# Patient Record
Sex: FEMALE | Race: WHITE | NOT HISPANIC OR LATINO | URBAN - METROPOLITAN AREA
[De-identification: names, ages, dates, MRNs, and addresses within clinical notes are randomized per-mention and may not be internally consistent; named-entity substitution may affect disease eponyms.]

---

## 2020-07-25 ENCOUNTER — TELEPHONE ENCOUNTER (OUTPATIENT)
Dept: URBAN - METROPOLITAN AREA CLINIC 13 | Facility: CLINIC | Age: 65
End: 2020-07-25

## 2020-07-25 RX ORDER — LOSARTAN POTASSIUM 100 MG/1
TAKE 1 TABLET DAILY TABLET, FILM COATED ORAL
Refills: 0 | OUTPATIENT
End: 2017-05-25

## 2020-07-25 RX ORDER — ATORVASTATIN CALCIUM 10 MG/1
TAKE 1 TABLET AT BEDTIME TABLET, FILM COATED ORAL
Refills: 0 | OUTPATIENT
End: 2017-05-25

## 2020-07-25 RX ORDER — METHOCARBAMOL 750 MG/1
TAKE 1 TABLET AT BEDTIME AS NEEDED TABLET, FILM COATED ORAL
Refills: 0 | OUTPATIENT
End: 2015-05-13

## 2020-07-25 RX ORDER — CARVEDILOL 3.12 MG/1
TAKE 1 TABLET BY MOUTH TWICE A DAY WITH MEALS TABLET, FILM COATED ORAL
Qty: 60 | Refills: 1 | OUTPATIENT
End: 2015-01-30

## 2020-07-25 RX ORDER — CARVEDILOL 3.12 MG/1
TAKE 1 TABLET TWICE DAILY WITH MEALS TABLET, FILM COATED ORAL
Qty: 60 | Refills: 0 | OUTPATIENT
Start: 2014-09-17 | End: 2016-03-04

## 2020-07-25 RX ORDER — INSULIN DETEMIR 100 [IU]/ML
INJECT SUBCUTANEOUSLY WITH EVENING MEAL OR AT BEDTIME AS DIRECTED INJECTION, SOLUTION SUBCUTANEOUS
Refills: 0 | OUTPATIENT
Start: 2009-12-23 | End: 2010-07-02

## 2020-07-25 RX ORDER — OXYCODONE HYDROCHLORIDE 5 MG/1
TAKE 1 TABLET EVERY 6 HOURS AS NEEDED FOR BREAKTHROUGH PAIN TABLET ORAL
Refills: 0 | OUTPATIENT
End: 2015-08-14

## 2020-07-25 RX ORDER — CALCIUM CARBONATE/VITAMIN D3 500-10/5ML
TAKE 2 CAPSULE TWICE DAILY LIQUID (ML) ORAL
Refills: 0 | OUTPATIENT
End: 2017-05-25

## 2020-07-25 RX ORDER — VALGANCICLOVIR 450 MG/1
TAKE 1 TABLET DAILY TABLET, FILM COATED ORAL
Qty: 30 | Refills: 0 | OUTPATIENT
Start: 2014-11-28 | End: 2015-05-13

## 2020-07-25 RX ORDER — TACROLIMUS 5 MG/1
TAKE 1 CAPSULE TWICE DAILY CAPSULE, GELATIN COATED ORAL
Refills: 0 | OUTPATIENT
End: 2016-06-15

## 2020-07-25 RX ORDER — SIROLIMUS 2 MG/1
TAKE 1 TABLET DAILY TABLET, SUGAR COATED ORAL
Refills: 0 | OUTPATIENT
End: 2017-05-25

## 2020-07-25 RX ORDER — AMLODIPINE BESYLATE 10 MG/1
TABLET ORAL
Qty: 90 | Refills: 0 | OUTPATIENT
Start: 2012-03-19 | End: 2012-10-31

## 2020-07-25 RX ORDER — FUROSEMIDE 40 MG/1
TAKE 1 TABLET DAILY TABLET ORAL
Qty: 30 | Refills: 5 | OUTPATIENT
Start: 2014-03-27 | End: 2015-01-30

## 2020-07-25 RX ORDER — MYCOPHENOLATE MOFETIL 500 MG/1
TAKE 1 TABLET TWICE DAILY TABLET, FILM COATED ORAL
Refills: 0 | OUTPATIENT
End: 2017-05-25

## 2020-07-25 RX ORDER — IRBESARTAN 300 MG
TAKE 1 TABLET DAILY TABLET ORAL
Refills: 0 | OUTPATIENT
End: 2013-01-11

## 2020-07-25 RX ORDER — MYCOPHENOLATE MOFETIL 250 MG/1
TAKE 1 CAPSULE TWICE DAILY CAPSULE ORAL
Refills: 0 | OUTPATIENT
End: 2017-11-06

## 2020-07-25 RX ORDER — INSULIN LISPRO 100 [IU]/ML
USE AS DIRECTED INJECTION, SOLUTION INTRAVENOUS; SUBCUTANEOUS
Refills: 0 | OUTPATIENT
Start: 2009-12-23 | End: 2010-07-02

## 2020-07-25 RX ORDER — SPIRONOLACTONE 100 MG/1
TAKE 1 TABLET DAILY TABLET, FILM COATED ORAL
Qty: 30 | Refills: 5 | OUTPATIENT
Start: 2014-03-27 | End: 2015-01-30

## 2020-07-25 RX ORDER — BISACODYL 10 MG/1
INSERT 1 SUPP DAILY PRN SUPPOSITORY RECTAL
Refills: 0 | OUTPATIENT
End: 2015-05-13

## 2020-07-25 RX ORDER — RIFAXIMIN 550 MG/1
TAKE 1 TABLET TWICE A DAY TABLET ORAL
Qty: 60 | Refills: 3 | OUTPATIENT
End: 2015-01-30

## 2020-07-25 RX ORDER — PANTOPRAZOLE SODIUM 40 MG/1
TAKE 1 TABLET TWICE DAILY TABLET, DELAYED RELEASE ORAL
Qty: 60 | Refills: 3 | OUTPATIENT

## 2020-07-25 RX ORDER — LACTULOSE 10 G/15ML
TAKE 1 TABLESPOONFUL DAILY SOLUTION ORAL
Qty: 450 | Refills: 3 | OUTPATIENT
End: 2015-01-30

## 2020-07-25 RX ORDER — PRAVASTATIN SODIUM 40 MG/1
TAKE 1 TABLET DAILY AT BEDTIME TABLET ORAL
Qty: 30 | Refills: 0 | OUTPATIENT
Start: 2012-08-24 | End: 2013-01-11

## 2020-07-25 RX ORDER — PANTOPRAZOLE SODIUM 40 MG/1
TAKE 1 TABLET DAILY TABLET, DELAYED RELEASE ORAL
Qty: 30 | Refills: 0 | OUTPATIENT
Start: 2014-06-11 | End: 2015-11-16

## 2020-07-25 RX ORDER — DRONABINOL 5 MG/1
TAKE 1 CAPSULE DAILY CAPSULE ORAL
Refills: 0 | OUTPATIENT
End: 2016-06-15

## 2020-07-25 RX ORDER — LISINOPRIL 5 MG/1
TAKE 1 TABLET DAILY TABLET ORAL
Refills: 0 | OUTPATIENT
End: 2014-06-16

## 2020-07-25 RX ORDER — PHENYLEPHRINE HCL 10 MG
TAKE AS DIRECTED TABLET ORAL
Refills: 0 | OUTPATIENT
Start: 2007-10-19 | End: 2009-09-25

## 2020-07-25 RX ORDER — CA/D3/MAG OX/ZINC/COP/MANG/BOR 600 MG-800
TAKE 1 TABLET DAILY TABLET,CHEWABLE ORAL
Refills: 0 | OUTPATIENT
End: 2017-11-06

## 2020-07-25 RX ORDER — MYCOPHENOLATE MOFETIL 250 MG/1
TAKE 2 CAPSULE TWICE DAILY CAPSULE ORAL
Refills: 0 | OUTPATIENT
End: 2015-05-13

## 2020-07-25 RX ORDER — IBUPROFEN 800 MG/1
TAKE  TABLET AS NEEDED TABLET ORAL
Refills: 0 | OUTPATIENT
End: 2012-10-31

## 2020-07-25 RX ORDER — MELATONIN 5 MG
TAKE AS DIRECTED. DOSAGE UNKNOWN CAPSULE ORAL
Refills: 0 | OUTPATIENT
End: 2013-01-11

## 2020-07-25 RX ORDER — MULTIVITAMIN
TAKE 1 TABLET DAILY TABLET ORAL
Refills: 0 | OUTPATIENT
End: 2014-06-16

## 2020-07-25 RX ORDER — DIPHENHYDRAMINE HCL 50 MG/1
TAKE 1 CAPSULE BEDTIME 25 MG CAPSULE ORAL
Refills: 0 | OUTPATIENT
Start: 2010-07-02 | End: 2012-08-24

## 2020-07-25 RX ORDER — URSODIOL 500 MG/1
TAKE 1 TABLET TWICE DAILY TABLET ORAL
Refills: 0 | OUTPATIENT
Start: 2014-11-28 | End: 2015-05-13

## 2020-07-25 RX ORDER — TACROLIMUS 1 MG/1
TAKE 8 CAPSULE TWICE DAILY CAPSULE, GELATIN COATED ORAL
Refills: 0 | OUTPATIENT
End: 2015-05-13

## 2020-07-26 ENCOUNTER — TELEPHONE ENCOUNTER (OUTPATIENT)
Dept: URBAN - METROPOLITAN AREA CLINIC 13 | Facility: CLINIC | Age: 65
End: 2020-07-26

## 2020-07-26 RX ORDER — AMLODIPINE BESYLATE 5 MG/1
TABLET ORAL
Qty: 30 | Refills: 0 | Status: ACTIVE | COMMUNITY
Start: 2011-09-27

## 2020-07-26 RX ORDER — PROMETHAZINE HYDROCHLORIDE 25 MG/1
TABLET ORAL
Qty: 20 | Refills: 0 | Status: ACTIVE | COMMUNITY
Start: 2015-03-31

## 2020-07-26 RX ORDER — OXYCODONE HYDROCHLORIDE 5 MG/1
TABLET ORAL
Qty: 30 | Refills: 0 | Status: ACTIVE | COMMUNITY
Start: 2015-01-22

## 2020-07-26 RX ORDER — MYCOPHENOLATE MOFETIL 250 MG/1
CAPSULE ORAL
Qty: 120 | Refills: 0 | Status: ACTIVE | COMMUNITY
Start: 2014-11-28

## 2020-07-26 RX ORDER — SULFAMETHOXAZOLE AND TRIMETHOPRIM 400; 80 MG/1; MG/1
TABLET ORAL
Qty: 30 | Refills: 0 | Status: ACTIVE | COMMUNITY
Start: 2014-11-28

## 2020-07-26 RX ORDER — URSODIOL 500 MG/1
TABLET ORAL
Qty: 60 | Refills: 0 | Status: ACTIVE | COMMUNITY
Start: 2014-10-15

## 2020-07-26 RX ORDER — ONDANSETRON HYDROCHLORIDE 4 MG/1
TABLET, FILM COATED ORAL
Qty: 8 | Refills: 0 | Status: ACTIVE | COMMUNITY
Start: 2015-03-06

## 2020-07-26 RX ORDER — RIBOFLAVIN (VITAMIN B2) 100 MG
TAKE 1 TABLET DAILY AS DIRECTED TABLET ORAL
Refills: 0 | Status: ACTIVE | COMMUNITY

## 2020-07-26 RX ORDER — EZETIMIBE 10 MG/1
TABLET ORAL
Qty: 30 | Refills: 0 | Status: ACTIVE | COMMUNITY
Start: 2012-01-30

## 2020-07-26 RX ORDER — CHOLESTYRAMINE 4 G/9G
POWDER, FOR SUSPENSION ORAL
Qty: 255 | Refills: 0 | Status: ACTIVE | COMMUNITY
Start: 2014-10-07

## 2020-07-26 RX ORDER — DOXAZOSIN 8 MG/1
TAKE 1 TABLET BEDTIME TABLET ORAL
Refills: 0 | Status: ACTIVE | COMMUNITY

## 2020-07-26 RX ORDER — CHOLESTYRAMINE 4 G/5.5G
POWDER, FOR SUSPENSION ORAL
Qty: 210 | Refills: 0 | Status: ACTIVE | COMMUNITY
Start: 2012-04-02

## 2020-07-26 RX ORDER — TRIAMCINOLONE ACETONIDE 1 MG/G
CREAM TOPICAL
Qty: 255 | Refills: 0 | Status: ACTIVE | COMMUNITY
Start: 2014-10-03

## 2020-07-26 RX ORDER — PREDNISONE 2.5 MG/1
TABLET ORAL
Qty: 16 | Refills: 0 | Status: ACTIVE | COMMUNITY
Start: 2014-11-28

## 2020-07-26 RX ORDER — LACTULOSE SOLUTION USP, 10 G/15 ML 10 G/15ML
SOLUTION ORAL; RECTAL
Qty: 255 | Refills: 0 | Status: ACTIVE | COMMUNITY
Start: 2014-05-13

## 2020-07-26 RX ORDER — FLUVASTATIN SODIUM 80 MG/1
TABLET, EXTENDED RELEASE ORAL
Qty: 30 | Refills: 0 | Status: ACTIVE | COMMUNITY
Start: 2012-01-24

## 2020-07-26 RX ORDER — INSULIN LISPRO 100 [IU]/ML
INJECT UNIT 3 TIMES DAILY SLIDING SCALE. PATIENT UNSURE OF DOSE INJECTION, SOLUTION INTRAVENOUS; SUBCUTANEOUS
Refills: 0 | Status: ACTIVE | COMMUNITY
Start: 2010-07-02

## 2020-07-26 RX ORDER — SIROLIMUS 2 MG/1
TAKE 0.5 TABLET TABLET, SUGAR COATED ORAL
Refills: 0 | Status: ACTIVE | COMMUNITY

## 2020-07-26 RX ORDER — SPIRONOLACTONE 25 MG/1
TAKE 2 TABLET DAILY TABLET, FILM COATED ORAL
Refills: 0 | Status: ACTIVE | COMMUNITY

## 2020-07-26 RX ORDER — TRAMADOL HYDROCHLORIDE 50 MG/1
TABLET ORAL
Qty: 30 | Refills: 0 | Status: ACTIVE | COMMUNITY
Start: 2014-11-01

## 2020-07-26 RX ORDER — INSULIN DETEMIR 100 [IU]/ML
INJECT 45 UNIT EVERY MORNING. PATIENT UNSURE OF DOSE INJECTION, SOLUTION SUBCUTANEOUS
Refills: 0 | Status: ACTIVE | COMMUNITY
Start: 2010-07-02

## 2020-07-26 RX ORDER — DRONABINOL 5 MG/1
CAPSULE ORAL
Qty: 30 | Refills: 0 | Status: ACTIVE | COMMUNITY
Start: 2015-01-24

## 2020-07-26 RX ORDER — BLOOD-GLUCOSE METER
EACH MISCELLANEOUS
Qty: 1 | Refills: 0 | Status: ACTIVE | COMMUNITY
Start: 2013-12-04

## 2020-07-26 RX ORDER — ASPIRIN 81 MG/1
TAKE 1 TABLET DAILY TABLET, CHEWABLE ORAL
Refills: 0 | Status: ACTIVE | COMMUNITY
Start: 2014-11-28

## 2020-07-26 RX ORDER — LACTULOSE SOLUTION USP, 10 G/15 ML 10 G/15ML
SOLUTION ORAL; RECTAL
Qty: 255 | Refills: 0 | Status: ACTIVE | COMMUNITY
Start: 2014-06-11

## 2020-07-26 RX ORDER — POLYETHYLENE GLYCOL 3350, SODIUM SULFATE, SODIUM CHLORIDE, POTASSIUM CHLORIDE, ASCORBIC ACID, SODIUM ASCORBATE 7.5-2.691G
KIT ORAL
Qty: 1 | Refills: 0 | Status: ACTIVE | COMMUNITY
Start: 2012-10-31

## 2020-07-26 RX ORDER — SODIUM POLYSTYRENE SULFONATE 4.1 MEQ/G
POWDER, FOR SUSPENSION ORAL; RECTAL
Qty: 255 | Refills: 0 | Status: ACTIVE | COMMUNITY
Start: 2015-02-26

## 2020-07-26 RX ORDER — SOFOSBUVIR 400 MG/1
TABLET, FILM COATED ORAL
Qty: 28 | Refills: 0 | Status: ACTIVE | COMMUNITY
Start: 2014-09-22

## 2020-07-26 RX ORDER — NITROFURANTOIN MONOHYDRATE/MACROCRYSTALLINE 25; 75 MG/1; MG/1
CAPSULE ORAL
Qty: 14 | Refills: 0 | Status: ACTIVE | COMMUNITY
Start: 2014-05-22

## 2020-07-26 RX ORDER — METHOCARBAMOL 500 MG/1
TABLET, FILM COATED ORAL
Qty: 30 | Refills: 0 | Status: ACTIVE | COMMUNITY
Start: 2014-07-22

## 2020-07-26 RX ORDER — CARVEDILOL 12.5 MG/1
TAKE 1 TABLET TWICE DAILY WITH MEALS TABLET, FILM COATED ORAL
Refills: 0 | Status: ACTIVE | COMMUNITY

## 2020-07-26 RX ORDER — PANTOPRAZOLE SODIUM 40 MG/1
TABLET, DELAYED RELEASE ORAL
Qty: 60 | Refills: 0 | Status: ACTIVE | COMMUNITY
Start: 2014-05-13

## 2020-07-26 RX ORDER — SODIUM BICARBONATE 650 MG/1
TAKE 1 TABLET 3 TIMES DAILY TABLET ORAL
Refills: 0 | Status: ACTIVE | COMMUNITY

## 2020-07-26 RX ORDER — ONDANSETRON HYDROCHLORIDE 4 MG/1
TABLET, FILM COATED ORAL
Qty: 6 | Refills: 0 | Status: ACTIVE | COMMUNITY
Start: 2014-05-13

## 2020-07-26 RX ORDER — AZITHROMYCIN DIHYDRATE 250 MG/1
TABLET, FILM COATED ORAL
Qty: 6 | Refills: 0 | Status: ACTIVE | COMMUNITY
Start: 2011-09-27

## 2020-07-26 RX ORDER — ONDANSETRON HYDROCHLORIDE 4 MG/1
TABLET, FILM COATED ORAL
Qty: 6 | Refills: 0 | Status: ACTIVE | COMMUNITY
Start: 2014-05-29

## 2022-08-04 ENCOUNTER — WEB ENCOUNTER (OUTPATIENT)
Dept: URBAN - METROPOLITAN AREA CLINIC 113 | Facility: CLINIC | Age: 67
End: 2022-08-04

## 2022-08-09 ENCOUNTER — OFFICE VISIT (OUTPATIENT)
Dept: URBAN - METROPOLITAN AREA CLINIC 113 | Facility: CLINIC | Age: 67
End: 2022-08-09
Payer: MEDICARE

## 2022-08-09 VITALS
SYSTOLIC BLOOD PRESSURE: 173 MMHG | HEIGHT: 66 IN | DIASTOLIC BLOOD PRESSURE: 64 MMHG | BODY MASS INDEX: 22.5 KG/M2 | RESPIRATION RATE: 20 BRPM | TEMPERATURE: 98 F | HEART RATE: 61 BPM | WEIGHT: 140 LBS

## 2022-08-09 DIAGNOSIS — R19.7 DIARRHEA, UNSPECIFIED TYPE: ICD-10-CM

## 2022-08-09 DIAGNOSIS — Z86.010 HISTORY OF ADENOMATOUS POLYP OF COLON: ICD-10-CM

## 2022-08-09 PROCEDURE — 99205 OFFICE O/P NEW HI 60 MIN: CPT | Performed by: INTERNAL MEDICINE

## 2022-08-09 RX ORDER — AMLODIPINE BESYLATE 5 MG/1
TABLET ORAL
Qty: 30 | Refills: 0 | Status: ON HOLD | COMMUNITY
Start: 2011-09-27

## 2022-08-09 RX ORDER — LACTULOSE SOLUTION USP, 10 G/15 ML 10 G/15ML
SOLUTION ORAL; RECTAL
Qty: 255 | Refills: 0 | Status: ON HOLD | COMMUNITY
Start: 2014-05-13

## 2022-08-09 RX ORDER — PROMETHAZINE HYDROCHLORIDE 25 MG/1
TABLET ORAL
Qty: 20 | Refills: 0 | Status: ON HOLD | COMMUNITY
Start: 2015-03-31

## 2022-08-09 RX ORDER — PREDNISONE 2.5 MG/1
TABLET ORAL
Qty: 16 | Refills: 0 | Status: ON HOLD | COMMUNITY
Start: 2014-11-28

## 2022-08-09 RX ORDER — NITROFURANTOIN MONOHYDRATE/MACROCRYSTALLINE 25; 75 MG/1; MG/1
CAPSULE ORAL
Qty: 14 | Refills: 0 | Status: ON HOLD | COMMUNITY
Start: 2014-05-22

## 2022-08-09 RX ORDER — SPIRONOLACTONE 25 MG/1
TAKE 2 TABLET DAILY TABLET, FILM COATED ORAL
Refills: 0 | Status: ON HOLD | COMMUNITY

## 2022-08-09 RX ORDER — OXYCODONE HYDROCHLORIDE 5 MG/1
TABLET ORAL
Qty: 30 | Refills: 0 | Status: ON HOLD | COMMUNITY
Start: 2015-01-22

## 2022-08-09 RX ORDER — AZITHROMYCIN DIHYDRATE 250 MG/1
TABLET, FILM COATED ORAL
Qty: 6 | Refills: 0 | Status: ON HOLD | COMMUNITY
Start: 2011-09-27

## 2022-08-09 RX ORDER — INSULIN LISPRO 100 [IU]/ML
INJECT UNIT 3 TIMES DAILY SLIDING SCALE. PATIENT UNSURE OF DOSE INJECTION, SOLUTION INTRAVENOUS; SUBCUTANEOUS
Refills: 0 | Status: ON HOLD | COMMUNITY
Start: 2010-07-02

## 2022-08-09 RX ORDER — ALLOPURINOL 100 MG/1
1 TABLET TABLET ORAL ONCE A DAY
Status: ACTIVE | COMMUNITY

## 2022-08-09 RX ORDER — SODIUM POLYSTYRENE SULFONATE 4.1 MEQ/G
POWDER, FOR SUSPENSION ORAL; RECTAL
Qty: 255 | Refills: 0 | Status: ON HOLD | COMMUNITY
Start: 2015-02-26

## 2022-08-09 RX ORDER — MYCOPHENOLATE MOFETIL 250 MG/1
CAPSULE ORAL
Qty: 120 | Refills: 0 | Status: ON HOLD | COMMUNITY
Start: 2014-11-28

## 2022-08-09 RX ORDER — DILTIAZEM HYDROCHLORIDE 300 MG/1
1 CAPSULE CAPSULE, COATED, EXTENDED RELEASE ORAL ONCE A DAY
Status: ACTIVE | COMMUNITY

## 2022-08-09 RX ORDER — CHOLESTYRAMINE 4 G/9G
POWDER, FOR SUSPENSION ORAL
Qty: 255 | Refills: 0 | Status: ON HOLD | COMMUNITY
Start: 2014-10-07

## 2022-08-09 RX ORDER — TACROLIMUS 1 MG/1
AS DIRECTED CAPSULE, GELATIN COATED ORAL TWICE A DAY
Status: ACTIVE | COMMUNITY

## 2022-08-09 RX ORDER — TRIAMCINOLONE ACETONIDE 1 MG/G
CREAM TOPICAL
Qty: 255 | Refills: 0 | Status: ON HOLD | COMMUNITY
Start: 2014-10-03

## 2022-08-09 RX ORDER — BUMETANIDE 2 MG/1
TAKE 1 TABLET TABLET ORAL TWICE A DAY
Status: ACTIVE | COMMUNITY

## 2022-08-09 RX ORDER — LIDOCAINE HCL 4 G/100G
1 APPLICATION CREAM TOPICAL THREE TIMES A DAY
Status: ACTIVE | COMMUNITY

## 2022-08-09 RX ORDER — CHOLESTYRAMINE 4 G/5.5G
POWDER, FOR SUSPENSION ORAL
Qty: 210 | Refills: 0 | Status: ON HOLD | COMMUNITY
Start: 2012-04-02

## 2022-08-09 RX ORDER — SODIUM BICARBONATE 650 MG/1
TAKE 1 TABLET 3 TIMES DAILY TABLET ORAL
Refills: 0 | Status: ON HOLD | COMMUNITY

## 2022-08-09 RX ORDER — PANTOPRAZOLE SODIUM 40 MG/1
TABLET, DELAYED RELEASE ORAL
Qty: 60 | Refills: 0 | Status: ON HOLD | COMMUNITY
Start: 2014-05-13

## 2022-08-09 RX ORDER — METHOCARBAMOL 500 MG/1
TABLET, FILM COATED ORAL
Qty: 30 | Refills: 0 | Status: ON HOLD | COMMUNITY
Start: 2014-07-22

## 2022-08-09 RX ORDER — EZETIMIBE 10 MG/1
TABLET ORAL
Qty: 30 | Refills: 0 | Status: ON HOLD | COMMUNITY
Start: 2012-01-30

## 2022-08-09 RX ORDER — TRAMADOL HYDROCHLORIDE 50 MG/1
TABLET ORAL
Qty: 30 | Refills: 0 | Status: ON HOLD | COMMUNITY
Start: 2014-11-01

## 2022-08-09 RX ORDER — SULFAMETHOXAZOLE AND TRIMETHOPRIM 400; 80 MG/1; MG/1
TABLET ORAL
Qty: 30 | Refills: 0 | Status: ON HOLD | COMMUNITY
Start: 2014-11-28

## 2022-08-09 RX ORDER — CARVEDILOL 12.5 MG/1
1 TABLET WITH FOOD TABLET, FILM COATED ORAL TWICE A DAY
Refills: 0 | Status: ACTIVE | COMMUNITY

## 2022-08-09 RX ORDER — SEVELAMER CARBONATE 2400 MG/1
1 PACKET MIXED WITH 60 ML OF WATER WITH MEALS POWDER, FOR SUSPENSION ORAL THREE TIMES A DAY
Status: ACTIVE | COMMUNITY

## 2022-08-09 RX ORDER — ONDANSETRON HYDROCHLORIDE 4 MG/1
TABLET, FILM COATED ORAL
Qty: 6 | Refills: 0 | Status: ON HOLD | COMMUNITY
Start: 2014-05-13

## 2022-08-09 RX ORDER — POLYETHYLENE GLYCOL 3350, SODIUM SULFATE, SODIUM CHLORIDE, POTASSIUM CHLORIDE, ASCORBIC ACID, SODIUM ASCORBATE 7.5-2.691G
KIT ORAL
Qty: 1 | Refills: 0 | Status: ON HOLD | COMMUNITY
Start: 2012-10-31

## 2022-08-09 RX ORDER — DRONABINOL 5 MG/1
CAPSULE ORAL
Qty: 30 | Refills: 0 | Status: ON HOLD | COMMUNITY
Start: 2015-01-24

## 2022-08-09 RX ORDER — SOFOSBUVIR 400 MG/1
TABLET, FILM COATED ORAL
Qty: 28 | Refills: 0 | Status: ON HOLD | COMMUNITY
Start: 2014-09-22

## 2022-08-09 RX ORDER — DOXAZOSIN 8 MG/1
TAKE 1 TABLET BEDTIME TABLET ORAL
Refills: 0 | Status: ON HOLD | COMMUNITY

## 2022-08-09 RX ORDER — BLOOD-GLUCOSE METER
EACH MISCELLANEOUS
Qty: 1 | Refills: 0 | Status: ON HOLD | COMMUNITY
Start: 2013-12-04

## 2022-08-09 RX ORDER — FLUVASTATIN SODIUM 80 MG/1
TABLET, EXTENDED RELEASE ORAL
Qty: 30 | Refills: 0 | Status: ON HOLD | COMMUNITY
Start: 2012-01-24

## 2022-08-09 RX ORDER — HYDRALAZINE HYDROCHLORIDE 10 MG/1
3 TABLETS TABLET, FILM COATED ORAL ONCE A DAY
Status: ACTIVE | COMMUNITY

## 2022-08-09 RX ORDER — SIROLIMUS 2 MG/1
TAKE 0.5 TABLET TABLET, SUGAR COATED ORAL
Refills: 0 | Status: ON HOLD | COMMUNITY

## 2022-08-09 RX ORDER — INSULIN DETEMIR 100 [IU]/ML
AS DIRECTED INJECTION, SOLUTION SUBCUTANEOUS
Status: ACTIVE | COMMUNITY

## 2022-08-09 RX ORDER — INSULIN DETEMIR 100 [IU]/ML
INJECT 45 UNIT EVERY MORNING. PATIENT UNSURE OF DOSE INJECTION, SOLUTION SUBCUTANEOUS
Refills: 0 | Status: ON HOLD | COMMUNITY
Start: 2010-07-02

## 2022-08-09 RX ORDER — ASPIRIN 81 MG/1
TAKE 1 TABLET DAILY TABLET, CHEWABLE ORAL
Refills: 0 | Status: ON HOLD | COMMUNITY
Start: 2014-11-28

## 2022-08-09 RX ORDER — RIBOFLAVIN (VITAMIN B2) 100 MG
TAKE 1 TABLET DAILY AS DIRECTED TABLET ORAL
Refills: 0 | Status: ON HOLD | COMMUNITY

## 2022-08-09 RX ORDER — URSODIOL 500 MG/1
TABLET ORAL
Qty: 60 | Refills: 0 | Status: ON HOLD | COMMUNITY
Start: 2014-10-15

## 2022-08-09 NOTE — HPI-TODAY'S VISIT:
Ms. 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  She was last seen here on May 25, 2017 for routine follow-up.  She subsequently underwent surveillance colonoscopy on November 6, 2017 which showed sigmoid colon diverticula, anastomosis in the sigmoid which was intact, but otherwise negative examination.  Follow-up colonoscopy is due this year.  She was seen in our office 6/15/16 to follow up  after treatment with Harvoni for hepatitis C.  She had been to [facility] and reported a recent hepatitis C RNA was negative.  Posttreatment hepatitis C viral RNA was negative.  She had also been diagnosed with syphilis after labs are performed to evaluate a rash on the palms of her hands.  This is also successfully treated.  Heartburn was controlled on omeprazole which she took during her course of   Loose stools had resolved on fiber.    5/23/16 RPR reactive.  CBC: CBC 4.8, hemoglobin 11.5, MCV 83.1, platelet 173.  BMP normal with the exception of glucose 135, BUN 42, creatinine 1.49, sodium 135, potassium 5.5, CO2 20.  LFTs: TB 0.3, , ALT 14, AST 13.  GGT 22.  Prograf 3.9.  Hepatitis C RNA undetectable.   At the time of her last OV on 5/25/17, she was doing well.  She continued to take over-the-counter Prilosec and denied heartburn.  Loose stools were generally controlled with daily fiber.  She denied any abdominal symptoms. She underwent surveillance colonoscopy in November 2017 (11/6/17).  This revealed A sigmoid colon anastomosis, left colon diverticulosis, but was otherwise negative.  Random colon biopsies were not done.  The patient states that she is doing "good" overall.  She is now on hemodialysis, which she gives herself at home, after her chronic renal insufficiency progressed into renal failure.  She has intermittent diarrhea with fecal urgency, although she denies seeing any blood in her stool.  She can also be constipated at times.  She is on the transplant list for a renal transplant in [place], although she is in the process of being evaluated at [facility] for the same process.  Both transplant centers wanted her to have a repeat colonoscopy performed prior to transplantation ideally.  The patient's most recent labs from May 2022 included an AST of 17, ALT of 13, alkaline phosphatase of 201, albumin of 4.6, hemoglobin of 11.5, hematocrit 35.5%.  She had a BUN of 35 and a creatinine of 5.16.  She gives me labs from late June of this year which showed a hemoglobin of 8.2 and a ferritin of 921.

## 2022-08-09 NOTE — EXAM-FUNCTIONAL ASSESSMENT
General--no acute distress Eyes--anicteric HENT--normocephalic, atraumatic head Neck--no lymphadenopathy Chest--normal breath sounds Heart--regular rate and rhythm; 3/6 systolic murmur Abdomen--soft, non tender, [place]ron scar upper abdomen, hernia abdominal wall, non distended, bowel sounds present Musculoskeletal--normal gait and station; AV fistula left arm Skin--no rashes Neurologic--Alert and oriented x 3 Psychiatric--stable mood, appropriate affect

## 2022-08-10 ENCOUNTER — LAB OUTSIDE AN ENCOUNTER (OUTPATIENT)
Dept: URBAN - METROPOLITAN AREA CLINIC 113 | Facility: CLINIC | Age: 67
End: 2022-08-10

## 2022-09-01 ENCOUNTER — TELEPHONE ENCOUNTER (OUTPATIENT)
Dept: URBAN - METROPOLITAN AREA CLINIC 113 | Facility: CLINIC | Age: 67
End: 2022-09-01

## 2022-09-04 ENCOUNTER — CLAIMS CREATED FROM THE CLAIM WINDOW (OUTPATIENT)
Dept: URBAN - METROPOLITAN AREA MEDICAL CENTER 19 | Facility: MEDICAL CENTER | Age: 67
End: 2022-09-04
Payer: MEDICARE

## 2022-09-04 DIAGNOSIS — D64.89 ANEMIA DUE TO OTHER CAUSE: ICD-10-CM

## 2022-09-04 DIAGNOSIS — R74.8 ABNORMAL ALKALINE PHOSPHATASE TEST: ICD-10-CM

## 2022-09-04 DIAGNOSIS — R74.01 ABNORMAL/ELEVATED TRANSAMINASE (SGOT, AMINOTRANSFERASE): ICD-10-CM

## 2022-09-04 DIAGNOSIS — Z94.4 LIVER TRANSPLANT: ICD-10-CM

## 2022-09-04 DIAGNOSIS — R10.84 ABDOMINAL CRAMPING, GENERALIZED: ICD-10-CM

## 2022-09-04 DIAGNOSIS — N18.6 END STAGE RENAL DISEASE: ICD-10-CM

## 2022-09-04 PROCEDURE — 99223 1ST HOSP IP/OBS HIGH 75: CPT | Performed by: INTERNAL MEDICINE

## 2022-09-04 PROCEDURE — 99222 1ST HOSP IP/OBS MODERATE 55: CPT | Performed by: INTERNAL MEDICINE

## 2022-09-05 ENCOUNTER — CLAIMS CREATED FROM THE CLAIM WINDOW (OUTPATIENT)
Dept: URBAN - METROPOLITAN AREA MEDICAL CENTER 19 | Facility: MEDICAL CENTER | Age: 67
End: 2022-09-05
Payer: MEDICARE

## 2022-09-05 DIAGNOSIS — D64.89 ANEMIA DUE TO OTHER CAUSE: ICD-10-CM

## 2022-09-05 DIAGNOSIS — Z94.4 LIVER TRANSPLANT: ICD-10-CM

## 2022-09-05 DIAGNOSIS — K43.9 VENTRAL HERNIA: ICD-10-CM

## 2022-09-05 DIAGNOSIS — N18.6 END STAGE RENAL DISEASE: ICD-10-CM

## 2022-09-05 DIAGNOSIS — R74.8 ABNORMAL ALKALINE PHOSPHATASE TEST: ICD-10-CM

## 2022-09-05 DIAGNOSIS — R74.01 ABNORMAL/ELEVATED TRANSAMINASE (SGOT, AMINOTRANSFERASE): ICD-10-CM

## 2022-09-05 PROCEDURE — 99232 SBSQ HOSP IP/OBS MODERATE 35: CPT | Performed by: INTERNAL MEDICINE

## 2022-09-05 PROCEDURE — 99233 SBSQ HOSP IP/OBS HIGH 50: CPT | Performed by: INTERNAL MEDICINE

## 2022-09-06 ENCOUNTER — CLAIMS CREATED FROM THE CLAIM WINDOW (OUTPATIENT)
Dept: URBAN - METROPOLITAN AREA MEDICAL CENTER 19 | Facility: MEDICAL CENTER | Age: 67
End: 2022-09-06
Payer: MEDICARE

## 2022-09-06 DIAGNOSIS — R74.01 ABNORMAL/ELEVATED TRANSAMINASE (SGOT, AMINOTRANSFERASE): ICD-10-CM

## 2022-09-06 DIAGNOSIS — D64.89 ANEMIA DUE TO OTHER CAUSE: ICD-10-CM

## 2022-09-06 DIAGNOSIS — Z94.4 LIVER TRANSPLANT: ICD-10-CM

## 2022-09-06 DIAGNOSIS — K43.9 VENTRAL HERNIA: ICD-10-CM

## 2022-09-06 DIAGNOSIS — N18.6 END STAGE RENAL DISEASE: ICD-10-CM

## 2022-09-06 DIAGNOSIS — R74.8 ELEVATED LIPASE: ICD-10-CM

## 2022-09-06 PROCEDURE — 99233 SBSQ HOSP IP/OBS HIGH 50: CPT | Performed by: INTERNAL MEDICINE

## 2022-09-06 PROCEDURE — 99232 SBSQ HOSP IP/OBS MODERATE 35: CPT | Performed by: INTERNAL MEDICINE

## 2022-09-07 ENCOUNTER — CLAIMS CREATED FROM THE CLAIM WINDOW (OUTPATIENT)
Dept: URBAN - METROPOLITAN AREA MEDICAL CENTER 19 | Facility: MEDICAL CENTER | Age: 67
End: 2022-09-07
Payer: MEDICARE

## 2022-09-07 DIAGNOSIS — K83.8 ACQUIRED DILATION OF BILE DUCT: ICD-10-CM

## 2022-09-07 DIAGNOSIS — R93.2 ABN FIND-BILIARY TRACT: ICD-10-CM

## 2022-09-07 DIAGNOSIS — R74.8 ABNORMAL LEVELS OF OTHER SERUM ENZYMES: ICD-10-CM

## 2022-09-07 DIAGNOSIS — K85.90 ABSCESS OF PANCREAS: ICD-10-CM

## 2022-09-07 PROCEDURE — 43264 ERCP REMOVE DUCT CALCULI: CPT | Performed by: INTERNAL MEDICINE

## 2022-09-07 PROCEDURE — 43262 ENDO CHOLANGIOPANCREATOGRAPH: CPT | Performed by: INTERNAL MEDICINE

## 2022-09-07 PROCEDURE — 74328 X-RAY BILE DUCT ENDOSCOPY: CPT | Performed by: INTERNAL MEDICINE

## 2022-09-08 ENCOUNTER — CLAIMS CREATED FROM THE CLAIM WINDOW (OUTPATIENT)
Dept: URBAN - METROPOLITAN AREA MEDICAL CENTER 19 | Facility: MEDICAL CENTER | Age: 67
End: 2022-09-08
Payer: MEDICARE

## 2022-09-08 DIAGNOSIS — R74.8 ABNORMAL ALKALINE PHOSPHATASE TEST: ICD-10-CM

## 2022-09-08 DIAGNOSIS — D64.89 ANEMIA DUE TO OTHER CAUSE: ICD-10-CM

## 2022-09-08 DIAGNOSIS — R74.01 ABNORMAL/ELEVATED TRANSAMINASE (SGOT, AMINOTRANSFERASE): ICD-10-CM

## 2022-09-08 DIAGNOSIS — N18.6 END STAGE RENAL DISEASE: ICD-10-CM

## 2022-09-08 DIAGNOSIS — Z94.4 LIVER TRANSPLANT: ICD-10-CM

## 2022-09-08 DIAGNOSIS — K43.9 VENTRAL HERNIA: ICD-10-CM

## 2022-09-08 PROCEDURE — 99232 SBSQ HOSP IP/OBS MODERATE 35: CPT | Performed by: INTERNAL MEDICINE

## 2022-09-20 PROBLEM — 105997008 DISORDER OF BILIARY TRACT: Status: ACTIVE | Noted: 2022-09-20

## 2022-09-29 ENCOUNTER — OFFICE VISIT (OUTPATIENT)
Dept: URBAN - METROPOLITAN AREA MEDICAL CENTER 19 | Facility: MEDICAL CENTER | Age: 67
End: 2022-09-29

## 2022-09-29 ENCOUNTER — TELEPHONE ENCOUNTER (OUTPATIENT)
Dept: URBAN - METROPOLITAN AREA CLINIC 113 | Facility: CLINIC | Age: 67
End: 2022-09-29

## 2022-09-29 RX ORDER — POLYETHYLENE GLYCOL 3350, SODIUM SULFATE, SODIUM CHLORIDE, POTASSIUM CHLORIDE, ASCORBIC ACID, SODIUM ASCORBATE 7.5-2.691G
KIT ORAL
Qty: 1 | Refills: 0 | Status: ON HOLD | COMMUNITY
Start: 2012-10-31

## 2022-09-29 RX ORDER — SOFOSBUVIR 400 MG/1
TABLET, FILM COATED ORAL
Qty: 28 | Refills: 0 | Status: ON HOLD | COMMUNITY
Start: 2014-09-22

## 2022-09-29 RX ORDER — SODIUM BICARBONATE 650 MG/1
TAKE 1 TABLET 3 TIMES DAILY TABLET ORAL
Refills: 0 | Status: ON HOLD | COMMUNITY

## 2022-09-29 RX ORDER — FLUVASTATIN SODIUM 80 MG/1
TABLET, EXTENDED RELEASE ORAL
Qty: 30 | Refills: 0 | Status: ON HOLD | COMMUNITY
Start: 2012-01-24

## 2022-09-29 RX ORDER — OXYCODONE HYDROCHLORIDE 5 MG/1
TABLET ORAL
Qty: 30 | Refills: 0 | Status: ON HOLD | COMMUNITY
Start: 2015-01-22

## 2022-09-29 RX ORDER — CARVEDILOL 12.5 MG/1
1 TABLET WITH FOOD TABLET, FILM COATED ORAL TWICE A DAY
Refills: 0 | Status: ACTIVE | COMMUNITY

## 2022-09-29 RX ORDER — ONDANSETRON HYDROCHLORIDE 4 MG/1
TABLET, FILM COATED ORAL
Qty: 6 | Refills: 0 | Status: ON HOLD | COMMUNITY
Start: 2014-05-13

## 2022-09-29 RX ORDER — INSULIN DETEMIR 100 [IU]/ML
INJECT 45 UNIT EVERY MORNING. PATIENT UNSURE OF DOSE INJECTION, SOLUTION SUBCUTANEOUS
Refills: 0 | Status: ON HOLD | COMMUNITY
Start: 2010-07-02

## 2022-09-29 RX ORDER — BLOOD-GLUCOSE METER
EACH MISCELLANEOUS
Qty: 1 | Refills: 0 | Status: ON HOLD | COMMUNITY
Start: 2013-12-04

## 2022-09-29 RX ORDER — SIROLIMUS 2 MG/1
TAKE 0.5 TABLET TABLET, SUGAR COATED ORAL
Refills: 0 | Status: ON HOLD | COMMUNITY

## 2022-09-29 RX ORDER — TACROLIMUS 1 MG/1
AS DIRECTED CAPSULE, GELATIN COATED ORAL TWICE A DAY
Status: ACTIVE | COMMUNITY

## 2022-09-29 RX ORDER — CHOLESTYRAMINE 4 G/9G
POWDER, FOR SUSPENSION ORAL
Qty: 255 | Refills: 0 | Status: ON HOLD | COMMUNITY
Start: 2014-10-07

## 2022-09-29 RX ORDER — EZETIMIBE 10 MG/1
TABLET ORAL
Qty: 30 | Refills: 0 | Status: ON HOLD | COMMUNITY
Start: 2012-01-30

## 2022-09-29 RX ORDER — INSULIN DETEMIR 100 [IU]/ML
AS DIRECTED INJECTION, SOLUTION SUBCUTANEOUS
Status: ACTIVE | COMMUNITY

## 2022-09-29 RX ORDER — HYDRALAZINE HYDROCHLORIDE 10 MG/1
3 TABLETS TABLET, FILM COATED ORAL ONCE A DAY
Status: ACTIVE | COMMUNITY

## 2022-09-29 RX ORDER — LACTULOSE SOLUTION USP, 10 G/15 ML 10 G/15ML
SOLUTION ORAL; RECTAL
Qty: 255 | Refills: 0 | Status: ON HOLD | COMMUNITY
Start: 2014-05-13

## 2022-09-29 RX ORDER — URSODIOL 500 MG/1
TABLET ORAL
Qty: 60 | Refills: 0 | Status: ON HOLD | COMMUNITY
Start: 2014-10-15

## 2022-09-29 RX ORDER — METHOCARBAMOL 500 MG/1
TABLET, FILM COATED ORAL
Qty: 30 | Refills: 0 | Status: ON HOLD | COMMUNITY
Start: 2014-07-22

## 2022-09-29 RX ORDER — SULFAMETHOXAZOLE AND TRIMETHOPRIM 400; 80 MG/1; MG/1
TABLET ORAL
Qty: 30 | Refills: 0 | Status: ON HOLD | COMMUNITY
Start: 2014-11-28

## 2022-09-29 RX ORDER — ALLOPURINOL 100 MG/1
1 TABLET TABLET ORAL ONCE A DAY
Status: ACTIVE | COMMUNITY

## 2022-09-29 RX ORDER — ASPIRIN 81 MG/1
TAKE 1 TABLET DAILY TABLET, CHEWABLE ORAL
Refills: 0 | Status: ON HOLD | COMMUNITY
Start: 2014-11-28

## 2022-09-29 RX ORDER — PROMETHAZINE HYDROCHLORIDE 25 MG/1
TABLET ORAL
Qty: 20 | Refills: 0 | Status: ON HOLD | COMMUNITY
Start: 2015-03-31

## 2022-09-29 RX ORDER — DILTIAZEM HYDROCHLORIDE 300 MG/1
1 CAPSULE CAPSULE, COATED, EXTENDED RELEASE ORAL ONCE A DAY
Status: ACTIVE | COMMUNITY

## 2022-09-29 RX ORDER — ONDANSETRON HYDROCHLORIDE 8 MG/1
1 TABLET AS NEEDED TABLET, FILM COATED ORAL ONCE A DAY
Qty: 30 | Refills: 0 | OUTPATIENT
Start: 2022-09-29

## 2022-09-29 RX ORDER — RIBOFLAVIN (VITAMIN B2) 100 MG
TAKE 1 TABLET DAILY AS DIRECTED TABLET ORAL
Refills: 0 | Status: ON HOLD | COMMUNITY

## 2022-09-29 RX ORDER — DRONABINOL 5 MG/1
CAPSULE ORAL
Qty: 30 | Refills: 0 | Status: ON HOLD | COMMUNITY
Start: 2015-01-24

## 2022-09-29 RX ORDER — SODIUM POLYSTYRENE SULFONATE 4.1 MEQ/G
POWDER, FOR SUSPENSION ORAL; RECTAL
Qty: 255 | Refills: 0 | Status: ON HOLD | COMMUNITY
Start: 2015-02-26

## 2022-09-29 RX ORDER — TRIAMCINOLONE ACETONIDE 1 MG/G
CREAM TOPICAL
Qty: 255 | Refills: 0 | Status: ON HOLD | COMMUNITY
Start: 2014-10-03

## 2022-09-29 RX ORDER — DOXAZOSIN 8 MG/1
TAKE 1 TABLET BEDTIME TABLET ORAL
Refills: 0 | Status: ON HOLD | COMMUNITY

## 2022-09-29 RX ORDER — PREDNISONE 2.5 MG/1
TABLET ORAL
Qty: 16 | Refills: 0 | Status: ON HOLD | COMMUNITY
Start: 2014-11-28

## 2022-09-29 RX ORDER — SPIRONOLACTONE 25 MG/1
TAKE 2 TABLET DAILY TABLET, FILM COATED ORAL
Refills: 0 | Status: ON HOLD | COMMUNITY

## 2022-09-29 RX ORDER — AZITHROMYCIN DIHYDRATE 250 MG/1
TABLET, FILM COATED ORAL
Qty: 6 | Refills: 0 | Status: ON HOLD | COMMUNITY
Start: 2011-09-27

## 2022-09-29 RX ORDER — AMLODIPINE BESYLATE 5 MG/1
TABLET ORAL
Qty: 30 | Refills: 0 | Status: ON HOLD | COMMUNITY
Start: 2011-09-27

## 2022-09-29 RX ORDER — SEVELAMER CARBONATE 2400 MG/1
1 PACKET MIXED WITH 60 ML OF WATER WITH MEALS POWDER, FOR SUSPENSION ORAL THREE TIMES A DAY
Status: ACTIVE | COMMUNITY

## 2022-09-29 RX ORDER — NITROFURANTOIN MONOHYDRATE/MACROCRYSTALLINE 25; 75 MG/1; MG/1
CAPSULE ORAL
Qty: 14 | Refills: 0 | Status: ON HOLD | COMMUNITY
Start: 2014-05-22

## 2022-09-29 RX ORDER — TRAMADOL HYDROCHLORIDE 50 MG/1
TABLET ORAL
Qty: 30 | Refills: 0 | Status: ON HOLD | COMMUNITY
Start: 2014-11-01

## 2022-09-29 RX ORDER — PANTOPRAZOLE SODIUM 40 MG/1
TABLET, DELAYED RELEASE ORAL
Qty: 60 | Refills: 0 | Status: ON HOLD | COMMUNITY
Start: 2014-05-13

## 2022-09-29 RX ORDER — CHOLESTYRAMINE 4 G/5.5G
POWDER, FOR SUSPENSION ORAL
Qty: 210 | Refills: 0 | Status: ON HOLD | COMMUNITY
Start: 2012-04-02

## 2022-09-29 RX ORDER — INSULIN LISPRO 100 [IU]/ML
INJECT UNIT 3 TIMES DAILY SLIDING SCALE. PATIENT UNSURE OF DOSE INJECTION, SOLUTION INTRAVENOUS; SUBCUTANEOUS
Refills: 0 | Status: ON HOLD | COMMUNITY
Start: 2010-07-02

## 2022-09-29 RX ORDER — MYCOPHENOLATE MOFETIL 250 MG/1
CAPSULE ORAL
Qty: 120 | Refills: 0 | Status: ON HOLD | COMMUNITY
Start: 2014-11-28

## 2022-09-29 RX ORDER — BUMETANIDE 2 MG/1
TAKE 1 TABLET TABLET ORAL TWICE A DAY
Status: ACTIVE | COMMUNITY

## 2022-09-29 RX ORDER — LIDOCAINE HCL 4 G/100G
1 APPLICATION CREAM TOPICAL THREE TIMES A DAY
Status: ACTIVE | COMMUNITY

## 2022-10-20 ENCOUNTER — TELEPHONE ENCOUNTER (OUTPATIENT)
Dept: URBAN - METROPOLITAN AREA CLINIC 113 | Facility: CLINIC | Age: 67
End: 2022-10-20

## 2022-10-26 ENCOUNTER — TELEPHONE ENCOUNTER (OUTPATIENT)
Dept: URBAN - METROPOLITAN AREA CLINIC 113 | Facility: CLINIC | Age: 67
End: 2022-10-26

## 2022-12-06 ENCOUNTER — WEB ENCOUNTER (OUTPATIENT)
Dept: URBAN - METROPOLITAN AREA CLINIC 107 | Facility: CLINIC | Age: 67
End: 2022-12-06

## 2022-12-06 ENCOUNTER — OFFICE VISIT (OUTPATIENT)
Dept: URBAN - METROPOLITAN AREA CLINIC 107 | Facility: CLINIC | Age: 67
End: 2022-12-06
Payer: MEDICARE

## 2022-12-06 VITALS
SYSTOLIC BLOOD PRESSURE: 153 MMHG | BODY MASS INDEX: 20.73 KG/M2 | DIASTOLIC BLOOD PRESSURE: 70 MMHG | HEART RATE: 59 BPM | HEIGHT: 66 IN | TEMPERATURE: 97.7 F | WEIGHT: 129 LBS

## 2022-12-06 DIAGNOSIS — Z86.010 HISTORY OF ADENOMATOUS POLYP OF COLON: ICD-10-CM

## 2022-12-06 DIAGNOSIS — K59.09 OTHER CONSTIPATION: ICD-10-CM

## 2022-12-06 DIAGNOSIS — K76.9 LIVER LESION: ICD-10-CM

## 2022-12-06 DIAGNOSIS — K21.9 ACID REFLUX: ICD-10-CM

## 2022-12-06 PROCEDURE — 99214 OFFICE O/P EST MOD 30 MIN: CPT | Performed by: NURSE PRACTITIONER

## 2022-12-06 RX ORDER — CARVEDILOL 12.5 MG/1
1 TABLET WITH FOOD TABLET, FILM COATED ORAL TWICE A DAY
Refills: 0 | Status: ACTIVE | COMMUNITY

## 2022-12-06 RX ORDER — RIBOFLAVIN (VITAMIN B2) 100 MG
TAKE 1 TABLET DAILY AS DIRECTED TABLET ORAL
Refills: 0 | Status: ON HOLD | COMMUNITY

## 2022-12-06 RX ORDER — SODIUM BICARBONATE 650 MG/1
TAKE 1 TABLET 3 TIMES DAILY TABLET ORAL
Refills: 0 | Status: ON HOLD | COMMUNITY

## 2022-12-06 RX ORDER — OMEPRAZOLE 20 MG/1
1 CAPSULE 30 MINUTES BEFORE MORNING MEAL CAPSULE, DELAYED RELEASE ORAL ONCE A DAY
Status: ACTIVE | COMMUNITY

## 2022-12-06 RX ORDER — AZITHROMYCIN DIHYDRATE 250 MG/1
TABLET, FILM COATED ORAL
Qty: 6 | Refills: 0 | Status: ON HOLD | COMMUNITY
Start: 2011-09-27

## 2022-12-06 RX ORDER — POLYETHYLENE GLYCOL 3350, SODIUM SULFATE, SODIUM CHLORIDE, POTASSIUM CHLORIDE, ASCORBIC ACID, SODIUM ASCORBATE 7.5-2.691G
KIT ORAL
Qty: 1 | Refills: 0 | Status: ON HOLD | COMMUNITY
Start: 2012-10-31

## 2022-12-06 RX ORDER — HYDRALAZINE HYDROCHLORIDE 10 MG/1
3 TABLETS TABLET, FILM COATED ORAL ONCE A DAY
Status: ACTIVE | COMMUNITY

## 2022-12-06 RX ORDER — PANTOPRAZOLE SODIUM 40 MG/1
TABLET, DELAYED RELEASE ORAL
Qty: 60 | Refills: 0 | Status: ON HOLD | COMMUNITY
Start: 2014-05-13

## 2022-12-06 RX ORDER — NITROFURANTOIN MONOHYDRATE/MACROCRYSTALLINE 25; 75 MG/1; MG/1
CAPSULE ORAL
Qty: 14 | Refills: 0 | Status: ON HOLD | COMMUNITY
Start: 2014-05-22

## 2022-12-06 RX ORDER — INSULIN DETEMIR 100 [IU]/ML
INJECT 45 UNIT EVERY MORNING. PATIENT UNSURE OF DOSE INJECTION, SOLUTION SUBCUTANEOUS
Refills: 0 | Status: ON HOLD | COMMUNITY
Start: 2010-07-02

## 2022-12-06 RX ORDER — DILTIAZEM HYDROCHLORIDE 300 MG/1
1 CAPSULE CAPSULE, COATED, EXTENDED RELEASE ORAL ONCE A DAY
Status: ACTIVE | COMMUNITY

## 2022-12-06 RX ORDER — PROMETHAZINE HYDROCHLORIDE 25 MG/1
TABLET ORAL
Qty: 20 | Refills: 0 | Status: ON HOLD | COMMUNITY
Start: 2015-03-31

## 2022-12-06 RX ORDER — TACROLIMUS 1 MG/1
AS DIRECTED CAPSULE, GELATIN COATED ORAL TWICE A DAY
Status: ACTIVE | COMMUNITY

## 2022-12-06 RX ORDER — CHOLESTYRAMINE 4 G/9G
POWDER, FOR SUSPENSION ORAL
Qty: 255 | Refills: 0 | Status: ON HOLD | COMMUNITY
Start: 2014-10-07

## 2022-12-06 RX ORDER — ALLOPURINOL 100 MG/1
1 TABLET TABLET ORAL ONCE A DAY
Status: ACTIVE | COMMUNITY

## 2022-12-06 RX ORDER — SIROLIMUS 2 MG/1
TAKE 0.5 TABLET TABLET, SUGAR COATED ORAL
Refills: 0 | Status: ON HOLD | COMMUNITY

## 2022-12-06 RX ORDER — ONDANSETRON HYDROCHLORIDE 8 MG/1
1 TABLET AS NEEDED TABLET, FILM COATED ORAL ONCE A DAY
Qty: 30 | Refills: 0 | Status: ACTIVE | COMMUNITY
Start: 2022-09-29

## 2022-12-06 RX ORDER — SULFAMETHOXAZOLE AND TRIMETHOPRIM 400; 80 MG/1; MG/1
TABLET ORAL
Qty: 30 | Refills: 0 | Status: ON HOLD | COMMUNITY
Start: 2014-11-28

## 2022-12-06 RX ORDER — MYCOPHENOLATE MOFETIL 250 MG/1
CAPSULE ORAL
Qty: 120 | Refills: 0 | Status: ON HOLD | COMMUNITY
Start: 2014-11-28

## 2022-12-06 RX ORDER — TRIAMCINOLONE ACETONIDE 1 MG/G
CREAM TOPICAL
Qty: 255 | Refills: 0 | Status: ON HOLD | COMMUNITY
Start: 2014-10-03

## 2022-12-06 RX ORDER — DOXAZOSIN 8 MG/1
TAKE 1 TABLET BEDTIME TABLET ORAL
Refills: 0 | Status: ON HOLD | COMMUNITY

## 2022-12-06 RX ORDER — LIDOCAINE HCL 4 G/100G
1 APPLICATION CREAM TOPICAL THREE TIMES A DAY
Status: ACTIVE | COMMUNITY

## 2022-12-06 RX ORDER — BUMETANIDE 2 MG/1
TAKE 1 TABLET TABLET ORAL TWICE A DAY
Status: ACTIVE | COMMUNITY

## 2022-12-06 RX ORDER — TRAMADOL HYDROCHLORIDE 50 MG/1
TABLET ORAL
Qty: 30 | Refills: 0 | Status: ON HOLD | COMMUNITY
Start: 2014-11-01

## 2022-12-06 RX ORDER — SOFOSBUVIR 400 MG/1
TABLET, FILM COATED ORAL
Qty: 28 | Refills: 0 | Status: ON HOLD | COMMUNITY
Start: 2014-09-22

## 2022-12-06 RX ORDER — PREDNISONE 2.5 MG/1
TABLET ORAL
Qty: 16 | Refills: 0 | Status: ON HOLD | COMMUNITY
Start: 2014-11-28

## 2022-12-06 RX ORDER — SPIRONOLACTONE 25 MG/1
TAKE 2 TABLET DAILY TABLET, FILM COATED ORAL
Refills: 0 | Status: ON HOLD | COMMUNITY

## 2022-12-06 RX ORDER — FLUVASTATIN SODIUM 80 MG/1
TABLET, EXTENDED RELEASE ORAL
Qty: 30 | Refills: 0 | Status: ON HOLD | COMMUNITY
Start: 2012-01-24

## 2022-12-06 RX ORDER — BLOOD-GLUCOSE METER
EACH MISCELLANEOUS
Qty: 1 | Refills: 0 | Status: ON HOLD | COMMUNITY
Start: 2013-12-04

## 2022-12-06 RX ORDER — LACTULOSE SOLUTION USP, 10 G/15 ML 10 G/15ML
SOLUTION ORAL; RECTAL
Qty: 255 | Refills: 0 | Status: ON HOLD | COMMUNITY
Start: 2014-05-13

## 2022-12-06 RX ORDER — INSULIN DETEMIR 100 [IU]/ML
AS DIRECTED INJECTION, SOLUTION SUBCUTANEOUS
Status: ACTIVE | COMMUNITY

## 2022-12-06 RX ORDER — ONDANSETRON HYDROCHLORIDE 4 MG/1
TABLET, FILM COATED ORAL
Qty: 6 | Refills: 0 | Status: ON HOLD | COMMUNITY
Start: 2014-05-13

## 2022-12-06 RX ORDER — AMLODIPINE BESYLATE 5 MG/1
TABLET ORAL
Qty: 30 | Refills: 0 | Status: ON HOLD | COMMUNITY
Start: 2011-09-27

## 2022-12-06 RX ORDER — CHOLESTYRAMINE 4 G/5.5G
POWDER, FOR SUSPENSION ORAL
Qty: 210 | Refills: 0 | Status: ON HOLD | COMMUNITY
Start: 2012-04-02

## 2022-12-06 RX ORDER — OXYCODONE HYDROCHLORIDE 5 MG/1
TABLET ORAL
Qty: 30 | Refills: 0 | Status: ON HOLD | COMMUNITY
Start: 2015-01-22

## 2022-12-06 RX ORDER — SODIUM POLYSTYRENE SULFONATE 4.1 MEQ/G
POWDER, FOR SUSPENSION ORAL; RECTAL
Qty: 255 | Refills: 0 | Status: ON HOLD | COMMUNITY
Start: 2015-02-26

## 2022-12-06 RX ORDER — METHOCARBAMOL 500 MG/1
TABLET, FILM COATED ORAL
Qty: 30 | Refills: 0 | Status: ON HOLD | COMMUNITY
Start: 2014-07-22

## 2022-12-06 RX ORDER — INSULIN LISPRO 100 [IU]/ML
INJECT UNIT 3 TIMES DAILY SLIDING SCALE. PATIENT UNSURE OF DOSE INJECTION, SOLUTION INTRAVENOUS; SUBCUTANEOUS
Refills: 0 | Status: ON HOLD | COMMUNITY
Start: 2010-07-02

## 2022-12-06 RX ORDER — ASPIRIN 81 MG/1
TAKE 1 TABLET DAILY TABLET, CHEWABLE ORAL
Refills: 0 | Status: ON HOLD | COMMUNITY
Start: 2014-11-28

## 2022-12-06 RX ORDER — SEVELAMER CARBONATE 2400 MG/1
1 PACKET MIXED WITH 60 ML OF WATER WITH MEALS POWDER, FOR SUSPENSION ORAL THREE TIMES A DAY
Status: ACTIVE | COMMUNITY

## 2022-12-06 RX ORDER — URSODIOL 500 MG/1
TABLET ORAL
Qty: 60 | Refills: 0 | Status: ON HOLD | COMMUNITY
Start: 2014-10-15

## 2022-12-06 RX ORDER — DRONABINOL 5 MG/1
CAPSULE ORAL
Qty: 30 | Refills: 0 | Status: ON HOLD | COMMUNITY
Start: 2015-01-24

## 2022-12-06 RX ORDER — EZETIMIBE 10 MG/1
TABLET ORAL
Qty: 30 | Refills: 0 | Status: ON HOLD | COMMUNITY
Start: 2012-01-30

## 2022-12-06 NOTE — HPI-TODAY'S VISIT:
This is a 67-year-old female with a history of cirrhosis/end-stage liver disease related to HCV previous nonresponder to interferon, status post orthotopic liver transplant (November 2014) at [facility] with detectable HCV post transplant status posttreatment in 2016 with [facility] achieving posttreatment response, end-stage renal disease on hemodialysis, GERD, colon diverticulosis, and diarrhea presenting for follow-up. She was last seen 8/9/2022.  Labs in May were unremarkable and labs in June showed hemoglobin of 8.2 and a ferritin of 9.21.  She reported she was doing well overall.  She was having intermittent diarrhea with fecal urgency that was becoming more frequent.  It was discussed this was likely functional.  She was overdue colonoscopy for adenoma surveillance and was scheduled for colonoscopy with random biopsies to rule out colitis.  She was to begin daily fiber. She reports an episode of pain indicated in the right upper quadrant radiating to her back associated with constipation, nausea, and vomiting.  Pain is not associated with eating.  She went to the emergency department at [facility].  She was informed there was an abnormality on her liver.  She is of the opinion that pain and nausea with vomiting were associated with constipation.  She has been having regular bowel movements but has not had a stool in the last 2 days.  She takes 1 teaspoon of Metamucil daily.  MiraLAX is recommended.  She took a dose yesterday without improvement and took Colace last night.  She reports "2 round balls" this morning.  She typically has diarrhea.  This has significantly improved with daily fiber.  She denies red blood per rectum or melena.  She is taking omeprazole 20 mg in the morning and pantoprazole 40 mg at bedtime.  She is wondering whether or not she should continue both of these medications.  Historically, she reports more acid reflux at night.  She is eating small amounts. She was recently informed by [facility] transplant center and she was not a candidate due to noted issues with blood supply to her lower gut.  She has subsequently been evaluated at [facility] and has been accepted on their renal transplant list.

## 2022-12-06 NOTE — HPI-OTHER HISTORIES
Labs 12/5/2022:CBC: WBC 6.2, hemoglobin 14.1, MCV 90, platelets 229.  BMP normal with exception of glucose 258, sodium 132, chloride 91, BUN 50, creatinine 7.12.  LFTs: TB 0.4, , ALT 12, AST 21.  Urinalysis normal with exception of 3+ protein.  PT 10.6, INR 0.94.  Lipase 201.  PTT 32.2.  Troponin  0.020. CT of the abdomen and pelvis with contrast 12/5/2022 (compared with CT abdomen and pelvis without contrast 9/4/2022 and MRI abdomen 9/5/2022): Hypoattenuating focus within the inferior right hepatic lobe measuring up to 1 cm which may be artifactual or could reflect a small liver lesion which is indeterminate and follow-up MRI of the liver is recommended.  Multiple ventral hernias which contain fat, transverse colon, and loops of small bowel without evidence of obstruction which appears similar to prior. Colonoscopy 9/29/2022:Sigmoid and descending diverticulosis, removal of a 3 mm sigmoid polyp,  benign-appearing stricture in the sigmoid colon that was traversed, nonbleeding internal hemorrhoids, the entire colon was normal and biopsied.  Pathology: Sigmoid polyp was a tubular adenoma.  Random biopsies demonstrated colonic mucosa negative for features of microscopic colitis.

## 2022-12-09 PROBLEM — 429047008: Status: ACTIVE | Noted: 2022-08-09

## 2022-12-09 PROBLEM — 16932000: Status: ACTIVE | Noted: 2022-12-09

## 2022-12-09 PROBLEM — 300331000: Status: ACTIVE | Noted: 2022-12-06

## 2022-12-12 ENCOUNTER — TELEPHONE ENCOUNTER (OUTPATIENT)
Dept: URBAN - METROPOLITAN AREA CLINIC 107 | Facility: CLINIC | Age: 67
End: 2022-12-12

## 2022-12-12 RX ORDER — DICYCLOMINE HYDROCHLORIDE 10 MG/1
1 TABLET CAPSULE ORAL
Qty: 60 | Refills: 3 | OUTPATIENT
Start: 2022-12-14 | End: 2023-04-13

## 2022-12-27 ENCOUNTER — LAB OUTSIDE AN ENCOUNTER (OUTPATIENT)
Dept: URBAN - METROPOLITAN AREA CLINIC 113 | Facility: CLINIC | Age: 67
End: 2022-12-27

## 2022-12-27 ENCOUNTER — TELEPHONE ENCOUNTER (OUTPATIENT)
Dept: URBAN - METROPOLITAN AREA CLINIC 113 | Facility: CLINIC | Age: 67
End: 2022-12-27

## 2022-12-27 RX ORDER — ALLOPURINOL 100 MG/1
1 TABLET TABLET ORAL ONCE A DAY
Status: ACTIVE | COMMUNITY

## 2022-12-27 RX ORDER — SIROLIMUS 2 MG/1
TAKE 0.5 TABLET TABLET, SUGAR COATED ORAL
Refills: 0 | Status: ON HOLD | COMMUNITY

## 2022-12-27 RX ORDER — DICYCLOMINE HYDROCHLORIDE 10 MG/1
1 TABLET CAPSULE ORAL
Qty: 60 | Refills: 3 | Status: ACTIVE | COMMUNITY
Start: 2022-12-14 | End: 2023-04-13

## 2022-12-27 RX ORDER — PANTOPRAZOLE SODIUM 40 MG/1
TABLET, DELAYED RELEASE ORAL
Qty: 60 | Refills: 0 | Status: ON HOLD | COMMUNITY
Start: 2014-05-13

## 2022-12-27 RX ORDER — SULFAMETHOXAZOLE AND TRIMETHOPRIM 400; 80 MG/1; MG/1
TABLET ORAL
Qty: 30 | Refills: 0 | Status: ON HOLD | COMMUNITY
Start: 2014-11-28

## 2022-12-27 RX ORDER — BUMETANIDE 2 MG/1
TAKE 1 TABLET TABLET ORAL TWICE A DAY
Status: ACTIVE | COMMUNITY

## 2022-12-27 RX ORDER — SEVELAMER CARBONATE 2400 MG/1
1 PACKET MIXED WITH 60 ML OF WATER WITH MEALS POWDER, FOR SUSPENSION ORAL THREE TIMES A DAY
Status: ACTIVE | COMMUNITY

## 2022-12-27 RX ORDER — POLYETHYLENE GLYCOL 3350, SODIUM SULFATE ANHYDROUS, SODIUM BICARBONATE, SODIUM CHLORIDE, POTASSIUM CHLORIDE 236; 22.74; 6.74; 5.86; 2.97 G/4L; G/4L; G/4L; G/4L; G/4L
AS DIRECTED POWDER, FOR SOLUTION ORAL ONCE
Qty: 236 | Refills: 0 | OUTPATIENT
Start: 2022-12-28 | End: 2022-12-29

## 2022-12-27 RX ORDER — INSULIN DETEMIR 100 [IU]/ML
INJECT 45 UNIT EVERY MORNING. PATIENT UNSURE OF DOSE INJECTION, SOLUTION SUBCUTANEOUS
Refills: 0 | Status: ON HOLD | COMMUNITY
Start: 2010-07-02

## 2022-12-27 RX ORDER — RIBOFLAVIN (VITAMIN B2) 100 MG
TAKE 1 TABLET DAILY AS DIRECTED TABLET ORAL
Refills: 0 | Status: ON HOLD | COMMUNITY

## 2022-12-27 RX ORDER — DRONABINOL 5 MG/1
CAPSULE ORAL
Qty: 30 | Refills: 0 | Status: ON HOLD | COMMUNITY
Start: 2015-01-24

## 2022-12-27 RX ORDER — DILTIAZEM HYDROCHLORIDE 300 MG/1
1 CAPSULE CAPSULE, COATED, EXTENDED RELEASE ORAL ONCE A DAY
Status: ACTIVE | COMMUNITY

## 2022-12-27 RX ORDER — PREDNISONE 2.5 MG/1
TABLET ORAL
Qty: 16 | Refills: 0 | Status: ON HOLD | COMMUNITY
Start: 2014-11-28

## 2022-12-27 RX ORDER — METHOCARBAMOL 500 MG/1
TABLET, FILM COATED ORAL
Qty: 30 | Refills: 0 | Status: ON HOLD | COMMUNITY
Start: 2014-07-22

## 2022-12-27 RX ORDER — BLOOD-GLUCOSE METER
EACH MISCELLANEOUS
Qty: 1 | Refills: 0 | Status: ON HOLD | COMMUNITY
Start: 2013-12-04

## 2022-12-27 RX ORDER — AZITHROMYCIN DIHYDRATE 250 MG/1
TABLET, FILM COATED ORAL
Qty: 6 | Refills: 0 | Status: ON HOLD | COMMUNITY
Start: 2011-09-27

## 2022-12-27 RX ORDER — CARVEDILOL 12.5 MG/1
1 TABLET WITH FOOD TABLET, FILM COATED ORAL TWICE A DAY
Refills: 0 | Status: ACTIVE | COMMUNITY

## 2022-12-27 RX ORDER — TACROLIMUS 1 MG/1
AS DIRECTED CAPSULE, GELATIN COATED ORAL TWICE A DAY
Status: ACTIVE | COMMUNITY

## 2022-12-27 RX ORDER — MYCOPHENOLATE MOFETIL 250 MG/1
CAPSULE ORAL
Qty: 120 | Refills: 0 | Status: ON HOLD | COMMUNITY
Start: 2014-11-28

## 2022-12-27 RX ORDER — ONDANSETRON HYDROCHLORIDE 8 MG/1
1 TABLET AS NEEDED TABLET, FILM COATED ORAL ONCE A DAY
Qty: 30 | Refills: 0 | Status: ACTIVE | COMMUNITY
Start: 2022-09-29

## 2022-12-27 RX ORDER — HYDRALAZINE HYDROCHLORIDE 10 MG/1
3 TABLETS TABLET, FILM COATED ORAL ONCE A DAY
Status: ACTIVE | COMMUNITY

## 2022-12-27 RX ORDER — INSULIN LISPRO 100 [IU]/ML
INJECT UNIT 3 TIMES DAILY SLIDING SCALE. PATIENT UNSURE OF DOSE INJECTION, SOLUTION INTRAVENOUS; SUBCUTANEOUS
Refills: 0 | Status: ON HOLD | COMMUNITY
Start: 2010-07-02

## 2022-12-27 RX ORDER — EZETIMIBE 10 MG/1
TABLET ORAL
Qty: 30 | Refills: 0 | Status: ON HOLD | COMMUNITY
Start: 2012-01-30

## 2022-12-27 RX ORDER — TRIAMCINOLONE ACETONIDE 1 MG/G
CREAM TOPICAL
Qty: 255 | Refills: 0 | Status: ON HOLD | COMMUNITY
Start: 2014-10-03

## 2022-12-27 RX ORDER — SODIUM POLYSTYRENE SULFONATE 4.1 MEQ/G
POWDER, FOR SUSPENSION ORAL; RECTAL
Qty: 255 | Refills: 0 | Status: ON HOLD | COMMUNITY
Start: 2015-02-26

## 2022-12-27 RX ORDER — OMEPRAZOLE 20 MG/1
1 CAPSULE 30 MINUTES BEFORE MORNING MEAL CAPSULE, DELAYED RELEASE ORAL ONCE A DAY
Status: ACTIVE | COMMUNITY

## 2022-12-27 RX ORDER — INSULIN DETEMIR 100 [IU]/ML
AS DIRECTED INJECTION, SOLUTION SUBCUTANEOUS
Status: ACTIVE | COMMUNITY

## 2022-12-27 RX ORDER — PROMETHAZINE HYDROCHLORIDE 25 MG/1
TABLET ORAL
Qty: 20 | Refills: 0 | Status: ON HOLD | COMMUNITY
Start: 2015-03-31

## 2022-12-27 RX ORDER — CHOLESTYRAMINE 4 G/5.5G
POWDER, FOR SUSPENSION ORAL
Qty: 210 | Refills: 0 | Status: ON HOLD | COMMUNITY
Start: 2012-04-02

## 2022-12-27 RX ORDER — POLYETHYLENE GLYCOL 3350, SODIUM SULFATE, SODIUM CHLORIDE, POTASSIUM CHLORIDE, ASCORBIC ACID, SODIUM ASCORBATE 7.5-2.691G
KIT ORAL
Qty: 1 | Refills: 0 | Status: ON HOLD | COMMUNITY
Start: 2012-10-31

## 2022-12-27 RX ORDER — TRAMADOL HYDROCHLORIDE 50 MG/1
TABLET ORAL
Qty: 30 | Refills: 0 | Status: ON HOLD | COMMUNITY
Start: 2014-11-01

## 2022-12-27 RX ORDER — SOFOSBUVIR 400 MG/1
TABLET, FILM COATED ORAL
Qty: 28 | Refills: 0 | Status: ON HOLD | COMMUNITY
Start: 2014-09-22

## 2022-12-27 RX ORDER — AMLODIPINE BESYLATE 5 MG/1
TABLET ORAL
Qty: 30 | Refills: 0 | Status: ON HOLD | COMMUNITY
Start: 2011-09-27

## 2022-12-27 RX ORDER — NITROFURANTOIN MONOHYDRATE/MACROCRYSTALLINE 25; 75 MG/1; MG/1
CAPSULE ORAL
Qty: 14 | Refills: 0 | Status: ON HOLD | COMMUNITY
Start: 2014-05-22

## 2022-12-27 RX ORDER — ONDANSETRON HYDROCHLORIDE 4 MG/1
TABLET, FILM COATED ORAL
Qty: 6 | Refills: 0 | Status: ON HOLD | COMMUNITY
Start: 2014-05-13

## 2022-12-27 RX ORDER — ASPIRIN 81 MG/1
TAKE 1 TABLET DAILY TABLET, CHEWABLE ORAL
Refills: 0 | Status: ON HOLD | COMMUNITY
Start: 2014-11-28

## 2022-12-27 RX ORDER — URSODIOL 500 MG/1
TABLET ORAL
Qty: 60 | Refills: 0 | Status: ON HOLD | COMMUNITY
Start: 2014-10-15

## 2022-12-27 RX ORDER — SPIRONOLACTONE 25 MG/1
TAKE 2 TABLET DAILY TABLET, FILM COATED ORAL
Refills: 0 | Status: ON HOLD | COMMUNITY

## 2022-12-27 RX ORDER — FLUVASTATIN SODIUM 80 MG/1
TABLET, EXTENDED RELEASE ORAL
Qty: 30 | Refills: 0 | Status: ON HOLD | COMMUNITY
Start: 2012-01-24

## 2022-12-27 RX ORDER — OXYCODONE HYDROCHLORIDE 5 MG/1
TABLET ORAL
Qty: 30 | Refills: 0 | Status: ON HOLD | COMMUNITY
Start: 2015-01-22

## 2022-12-27 RX ORDER — LACTULOSE SOLUTION USP, 10 G/15 ML 10 G/15ML
SOLUTION ORAL; RECTAL
Qty: 255 | Refills: 0 | Status: ON HOLD | COMMUNITY
Start: 2014-05-13

## 2022-12-27 RX ORDER — SODIUM BICARBONATE 650 MG/1
TAKE 1 TABLET 3 TIMES DAILY TABLET ORAL
Refills: 0 | Status: ON HOLD | COMMUNITY

## 2022-12-27 RX ORDER — LIDOCAINE HCL 4 G/100G
1 APPLICATION CREAM TOPICAL THREE TIMES A DAY
Status: ACTIVE | COMMUNITY

## 2022-12-27 RX ORDER — DOXAZOSIN 8 MG/1
TAKE 1 TABLET BEDTIME TABLET ORAL
Refills: 0 | Status: ON HOLD | COMMUNITY

## 2022-12-27 RX ORDER — CHOLESTYRAMINE 4 G/9G
POWDER, FOR SUSPENSION ORAL
Qty: 255 | Refills: 0 | Status: ON HOLD | COMMUNITY
Start: 2014-10-07

## 2023-01-05 ENCOUNTER — OFFICE VISIT (OUTPATIENT)
Dept: URBAN - METROPOLITAN AREA CLINIC 107 | Facility: CLINIC | Age: 68
End: 2023-01-05
Payer: MEDICARE

## 2023-01-05 VITALS
SYSTOLIC BLOOD PRESSURE: 179 MMHG | TEMPERATURE: 97.7 F | BODY MASS INDEX: 20.57 KG/M2 | HEART RATE: 61 BPM | WEIGHT: 128 LBS | DIASTOLIC BLOOD PRESSURE: 72 MMHG | HEIGHT: 66 IN

## 2023-01-05 DIAGNOSIS — R10.13 EPIGASTRIC PAIN: ICD-10-CM

## 2023-01-05 DIAGNOSIS — K59.09 OTHER CONSTIPATION: ICD-10-CM

## 2023-01-05 DIAGNOSIS — R10.11 RUQ ABDOMINAL PAIN: ICD-10-CM

## 2023-01-05 PROCEDURE — 99214 OFFICE O/P EST MOD 30 MIN: CPT | Performed by: INTERNAL MEDICINE

## 2023-01-05 RX ORDER — OMEPRAZOLE 20 MG/1
1 CAPSULE 30 MINUTES BEFORE MORNING MEAL CAPSULE, DELAYED RELEASE ORAL ONCE A DAY
Status: ACTIVE | COMMUNITY

## 2023-01-05 RX ORDER — ONDANSETRON HYDROCHLORIDE 8 MG/1
1 TABLET AS NEEDED TABLET, FILM COATED ORAL ONCE A DAY
Qty: 30 | Refills: 0 | Status: ACTIVE | COMMUNITY
Start: 2022-09-29

## 2023-01-05 RX ORDER — TACROLIMUS 1 MG/1
AS DIRECTED CAPSULE, GELATIN COATED ORAL TWICE A DAY
Status: ACTIVE | COMMUNITY

## 2023-01-05 RX ORDER — HYDRALAZINE HYDROCHLORIDE 10 MG/1
3 TABLETS TABLET, FILM COATED ORAL ONCE A DAY
Status: ACTIVE | COMMUNITY

## 2023-01-05 RX ORDER — ALLOPURINOL 100 MG/1
1 TABLET TABLET ORAL ONCE A DAY
Status: ACTIVE | COMMUNITY

## 2023-01-05 RX ORDER — SEVELAMER CARBONATE 2400 MG/1
1 PACKET MIXED WITH 60 ML OF WATER WITH MEALS POWDER, FOR SUSPENSION ORAL THREE TIMES A DAY
Status: ACTIVE | COMMUNITY

## 2023-01-05 RX ORDER — DILTIAZEM HYDROCHLORIDE 300 MG/1
1 CAPSULE CAPSULE, COATED, EXTENDED RELEASE ORAL ONCE A DAY
Status: ACTIVE | COMMUNITY

## 2023-01-05 RX ORDER — BUMETANIDE 2 MG/1
TAKE 1 TABLET TABLET ORAL TWICE A DAY
Status: ACTIVE | COMMUNITY

## 2023-01-05 RX ORDER — CARVEDILOL 12.5 MG/1
1 TABLET WITH FOOD TABLET, FILM COATED ORAL TWICE A DAY
Refills: 0 | Status: ACTIVE | COMMUNITY

## 2023-01-05 RX ORDER — DICYCLOMINE HYDROCHLORIDE 10 MG/1
1 TABLET CAPSULE ORAL
Qty: 60 | Refills: 3 | Status: ACTIVE | COMMUNITY
Start: 2022-12-14 | End: 2023-04-13

## 2023-01-05 NOTE — HPI-TODAY'S VISIT:
This is a 67-year-old female with a history of cirrhosis/end-stage liver disease related to HCV previous nonresponder to interferon, status post orthotopic liver transplant (November 2014) at [facility] with detectable HCV post transplant status posttreatment in 2016 with [facility] achieving posttreatment response, end-stage renal disease on hemodialysis, GERD, colon diverticulosis, and diarrhea presenting for follow-up.  She was last seen by Veronica Angeles on 12/6/22.   Labs in May had been unremarkable and labs in June showed hemoglobin of 8.2 and a ferritin of 9.21.  She reported she was doing well overall.  She was having intermittent diarrhea with fecal urgency that was becoming more frequent.  It was discussed this was likely functional.  She was overdue colonoscopy for adenoma surveillance and was scheduled for colonoscopy with random biopsies to rule out colitis.  She was to begin daily fiber.  Colonoscopy 9/29/2022:Sigmoid and descending diverticulosis, removal of a 3 mm sigmoid polyp,  benign-appearing stricture in the sigmoid colon that was traversed, nonbleeding internal hemorrhoids, the entire colon was normal and biopsied.  Pathology: Sigmoid polyp was a tubular adenoma.  Random biopsies demonstrated colonic mucosa negative for features of microscopic colitis.  At her 12/6/22 visit, she reported an episode of RUQ pain radiating to her back associated with constipation, nausea, and vomiting.  Pain was not associated with eating.  She went to the emergency department at [facility] and was informed there was an "abnormality on her liver."    CT of the abdomen and pelvis with contrast 12/5/2022 (compared with CT abdomen and pelvis without contrast 9/4/2022 and MRI abdomen 9/5/2022): Hypoattenuating focus within the inferior right hepatic lobe measuring up to 1 cm which may be artifactual or could reflect a small liver lesion which is indeterminate and follow-up MRI of the liver is recommended.  Multiple ventral hernias which contain fat, transverse colon, and loops of small bowel without evidence of obstruction which appears similar to prior.  She was of the opinion that pain, nausea and vomiting were associated with constipation.   She denied red blood per rectum or melena.  She had been taking omeprazole 20 mg in the morning and pantoprazole 40 mg at bedtime and wondered whether or not she should continue both of these medications.    She was recently informed by the renal transplant center at [facility] that she was not a candidate for renal transplant due to concerns over the blood supply to her lower gut.  She was subsequently evaluated at [facility] and has been accepted on their renal transplant list.  After her last office visit, she was placed on milk of magnesia 4 tablespoons daily, MiraLAX as needed, and Metamucil was increased to 2 tablespoons/day.  An MRI was scheduled to further evaluate the liver lesion described on her CT scan.  She was also placed on pantoprazole 40 mg daily.  She continued to have pain in her right upper quadrant area radiating through to her back, and was given a prescription for chlordiazepoxide which helped.  She recently saw Dr. Junior Armstrong, her primary care physician, who prescribed some Flexeril for her which seems to be helping.  Her bowel habits are better overall.  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  A tiny focus of arterial enhancement in the left hepatic lobe was noted which was indeterminate in etiology and may rest and a small vascular malformation.  Short follow-up MRI within 3 to 6 months recommended to exclude a small neoplastic process.  A 1.5 cm cystic structure abutting the duodenum and head of the pancreas which is felt to represent a pancreatic cystic lesion.  Duodenal diverticulum was noted, as well as a 2 cm cystic structure abutting the duodenum or common hepatic duct which is felt to represent either a duodenal diverticulum or a dilated cystic duct.  Follow-up CT or MRI recommended.  Ventral hernias were also noted containing bowel loops but with no evidence of obstruction.  She was also noted to have multiple small bilateral renal cysts.  Gray also had pancreas divisum.  The patient was referred by her primary care physician to see Dr. Johann Belle of vascular surgery.  Apparently, there were concerns about chronic mesenteric ischemia.  She is to see him tomorrow.  Notably, the patient's abdominal pain is not postprandial.  Most recent labs:    Labs 12/5/2022:CBC: WBC 6.2, hemoglobin 14.1, MCV 90, platelets 229.  BMP normal with exception of glucose 258, sodium 132, chloride 91, BUN 50, creatinine 7.12.  LFTs: TB 0.4, , ALT 12, AST 21.  Urinalysis normal with exception of 3+ protein.  PT 10.6, INR 0.94.  Lipase 201.  PTT 32.2.  Troponin  0.020

## 2023-01-06 ENCOUNTER — TELEPHONE ENCOUNTER (OUTPATIENT)
Dept: URBAN - METROPOLITAN AREA CLINIC 107 | Facility: CLINIC | Age: 68
End: 2023-01-06

## 2023-01-09 ENCOUNTER — LAB OUTSIDE AN ENCOUNTER (OUTPATIENT)
Dept: URBAN - METROPOLITAN AREA CLINIC 107 | Facility: CLINIC | Age: 68
End: 2023-01-09

## 2023-01-10 LAB
A/G RATIO: 1.9
ALBUMIN: 4.2
ALKALINE PHOSPHATASE: 172
ALT (SGPT): 12
AMBIG ABBREV CMP14 DEFAULT: (no result)
AST (SGOT): 11
BASO (ABSOLUTE): 0
BASOS: 1
BILIRUBIN, TOTAL: 0.3
BUN/CREATININE RATIO: 4
BUN: 19
CALCIUM: 9.3
CARBON DIOXIDE, TOTAL: 25
CHLORIDE: 99
CREATININE: 4.76
EGFR: 10
EOS (ABSOLUTE): 0.2
EOS: 2
GLOBULIN, TOTAL: 2.2
GLUCOSE: 234
HEMATOCRIT: 33.3
HEMATOLOGY COMMENTS:: (no result)
HEMOGLOBIN: 11.1
IMMATURE CELLS: (no result)
IMMATURE GRANS (ABS): 0
IMMATURE GRANULOCYTES: 0
LIPASE: 30
LYMPHS (ABSOLUTE): 1.7
LYMPHS: 26
MCH: 29.7
MCHC: 33.3
MCV: 89
MONOCYTES(ABSOLUTE): 0.4
MONOCYTES: 6
NEUTROPHILS (ABSOLUTE): 4
NEUTROPHILS: 65
NRBC: (no result)
PLATELETS: 238
POTASSIUM: 3.8
PROTEIN, TOTAL: 6.4
RBC: 3.74
RDW: 15.9
SODIUM: 140
WBC: 6.3

## 2023-01-13 ENCOUNTER — TELEPHONE ENCOUNTER (OUTPATIENT)
Dept: URBAN - METROPOLITAN AREA CLINIC 107 | Facility: CLINIC | Age: 68
End: 2023-01-13

## 2023-01-13 RX ORDER — ONDANSETRON HYDROCHLORIDE 8 MG/1
1 TABLET AS NEEDED TABLET, FILM COATED ORAL ONCE A DAY
Qty: 30 | Refills: 2
Start: 2022-09-29

## 2023-01-23 ENCOUNTER — CLAIMS CREATED FROM THE CLAIM WINDOW (OUTPATIENT)
Dept: URBAN - METROPOLITAN AREA MEDICAL CENTER 19 | Facility: MEDICAL CENTER | Age: 68
End: 2023-01-23

## 2023-01-23 ENCOUNTER — CLAIMS CREATED FROM THE CLAIM WINDOW (OUTPATIENT)
Dept: URBAN - METROPOLITAN AREA MEDICAL CENTER 19 | Facility: MEDICAL CENTER | Age: 68
End: 2023-01-23
Payer: MEDICARE

## 2023-01-23 DIAGNOSIS — L02.91 ABSCESS: ICD-10-CM

## 2023-01-23 DIAGNOSIS — R93.2 ABN FIND-BILIARY TRACT: ICD-10-CM

## 2023-01-23 DIAGNOSIS — Z94.4 LIVER TRANSPLANT: ICD-10-CM

## 2023-01-23 DIAGNOSIS — K86.2 PANCREATIC CYST: ICD-10-CM

## 2023-01-23 DIAGNOSIS — K76.89 HEPATIC CYST: ICD-10-CM

## 2023-01-23 DIAGNOSIS — R10.11 RUQ ABDOMINAL PAIN: ICD-10-CM

## 2023-01-23 PROBLEM — 128477000: Status: ACTIVE | Noted: 2023-01-23

## 2023-01-23 PROCEDURE — 43238 EGD US FINE NEEDLE BX/ASPIR: CPT | Performed by: INTERNAL MEDICINE

## 2023-01-23 RX ORDER — SEVELAMER CARBONATE 2400 MG/1
1 PACKET MIXED WITH 60 ML OF WATER WITH MEALS POWDER, FOR SUSPENSION ORAL THREE TIMES A DAY
Status: ACTIVE | COMMUNITY

## 2023-01-23 RX ORDER — TACROLIMUS 1 MG/1
AS DIRECTED CAPSULE, GELATIN COATED ORAL TWICE A DAY
Status: ACTIVE | COMMUNITY

## 2023-01-23 RX ORDER — CIPROFLOXACIN HYDROCHLORIDE 500 MG/1
1 TABLET TABLET, FILM COATED ORAL
Qty: 14 TABLET | Refills: 0 | OUTPATIENT
Start: 2023-01-23 | End: 2023-01-30

## 2023-01-23 RX ORDER — ONDANSETRON HYDROCHLORIDE 8 MG/1
1 TABLET AS NEEDED TABLET, FILM COATED ORAL ONCE A DAY
Qty: 30 | Refills: 2 | Status: ACTIVE | COMMUNITY
Start: 2022-09-29

## 2023-01-23 RX ORDER — DILTIAZEM HYDROCHLORIDE 300 MG/1
1 CAPSULE CAPSULE, COATED, EXTENDED RELEASE ORAL ONCE A DAY
Status: ACTIVE | COMMUNITY

## 2023-01-23 RX ORDER — DICYCLOMINE HYDROCHLORIDE 10 MG/1
1 TABLET CAPSULE ORAL
Qty: 60 | Refills: 3 | Status: ACTIVE | COMMUNITY
Start: 2022-12-14 | End: 2023-04-13

## 2023-01-23 RX ORDER — HYDRALAZINE HYDROCHLORIDE 10 MG/1
3 TABLETS TABLET, FILM COATED ORAL ONCE A DAY
Status: ACTIVE | COMMUNITY

## 2023-01-23 RX ORDER — BUMETANIDE 2 MG/1
TAKE 1 TABLET TABLET ORAL TWICE A DAY
Status: ACTIVE | COMMUNITY

## 2023-01-23 RX ORDER — CARVEDILOL 12.5 MG/1
1 TABLET WITH FOOD TABLET, FILM COATED ORAL TWICE A DAY
Refills: 0 | Status: ACTIVE | COMMUNITY

## 2023-01-23 RX ORDER — OMEPRAZOLE 20 MG/1
1 CAPSULE 30 MINUTES BEFORE MORNING MEAL CAPSULE, DELAYED RELEASE ORAL ONCE A DAY
Status: ACTIVE | COMMUNITY

## 2023-01-23 RX ORDER — ALLOPURINOL 100 MG/1
1 TABLET TABLET ORAL ONCE A DAY
Status: ACTIVE | COMMUNITY

## 2023-01-30 ENCOUNTER — CLAIMS CREATED FROM THE CLAIM WINDOW (OUTPATIENT)
Dept: URBAN - METROPOLITAN AREA MEDICAL CENTER 19 | Facility: MEDICAL CENTER | Age: 68
End: 2023-01-30
Payer: MEDICARE

## 2023-01-30 DIAGNOSIS — R10.84 ABDOMINAL CRAMPING, GENERALIZED: ICD-10-CM

## 2023-01-30 DIAGNOSIS — K59.09 CHANGE IN BOWEL MOVEMENTS INTERMITTENT CONSTIPATION. URGENCY IN THE MORNING.: ICD-10-CM

## 2023-01-30 DIAGNOSIS — K86.89 ACUTE PANCREATIC FLUID COLLECTION: ICD-10-CM

## 2023-01-30 DIAGNOSIS — Z94.4 LIVER TRANSPLANT: ICD-10-CM

## 2023-01-30 PROCEDURE — 99253 IP/OBS CNSLTJ NEW/EST LOW 45: CPT | Performed by: PHYSICIAN ASSISTANT

## 2023-01-30 PROCEDURE — 99221 1ST HOSP IP/OBS SF/LOW 40: CPT | Performed by: PHYSICIAN ASSISTANT

## 2023-01-31 ENCOUNTER — TELEPHONE ENCOUNTER (OUTPATIENT)
Dept: URBAN - METROPOLITAN AREA CLINIC 107 | Facility: CLINIC | Age: 68
End: 2023-01-31

## 2023-01-31 ENCOUNTER — CLAIMS CREATED FROM THE CLAIM WINDOW (OUTPATIENT)
Dept: URBAN - METROPOLITAN AREA MEDICAL CENTER 19 | Facility: MEDICAL CENTER | Age: 68
End: 2023-01-31
Payer: MEDICARE

## 2023-01-31 DIAGNOSIS — K59.09 CHANGE IN BOWEL MOVEMENTS INTERMITTENT CONSTIPATION. URGENCY IN THE MORNING.: ICD-10-CM

## 2023-01-31 DIAGNOSIS — Z94.4 LIVER TRANSPLANT: ICD-10-CM

## 2023-01-31 DIAGNOSIS — R10.84 ABDOMINAL CRAMPING, GENERALIZED: ICD-10-CM

## 2023-01-31 DIAGNOSIS — K86.89 ACUTE PANCREATIC FLUID COLLECTION: ICD-10-CM

## 2023-01-31 PROCEDURE — 99232 SBSQ HOSP IP/OBS MODERATE 35: CPT | Performed by: PHYSICIAN ASSISTANT

## 2023-02-01 ENCOUNTER — CLAIMS CREATED FROM THE CLAIM WINDOW (OUTPATIENT)
Dept: URBAN - METROPOLITAN AREA MEDICAL CENTER 19 | Facility: MEDICAL CENTER | Age: 68
End: 2023-02-01
Payer: MEDICARE

## 2023-02-01 DIAGNOSIS — R10.84 ABDOMINAL CRAMPING, GENERALIZED: ICD-10-CM

## 2023-02-01 DIAGNOSIS — Z94.4 LIVER TRANSPLANT: ICD-10-CM

## 2023-02-01 DIAGNOSIS — K59.09 CHANGE IN BOWEL MOVEMENTS INTERMITTENT CONSTIPATION. URGENCY IN THE MORNING.: ICD-10-CM

## 2023-02-01 DIAGNOSIS — K86.89 ACUTE PANCREATIC FLUID COLLECTION: ICD-10-CM

## 2023-02-01 PROBLEM — 85057007 HEPATIC CYST: Status: ACTIVE | Noted: 2023-02-01

## 2023-02-01 PROCEDURE — 99232 SBSQ HOSP IP/OBS MODERATE 35: CPT | Performed by: PHYSICIAN ASSISTANT

## 2023-02-02 ENCOUNTER — CLAIMS CREATED FROM THE CLAIM WINDOW (OUTPATIENT)
Dept: URBAN - METROPOLITAN AREA MEDICAL CENTER 19 | Facility: MEDICAL CENTER | Age: 68
End: 2023-02-02
Payer: MEDICARE

## 2023-02-02 DIAGNOSIS — K86.89 ACUTE PANCREATIC FLUID COLLECTION: ICD-10-CM

## 2023-02-02 DIAGNOSIS — Z94.4 LIVER TRANSPLANT: ICD-10-CM

## 2023-02-02 DIAGNOSIS — K59.09 CHANGE IN BOWEL MOVEMENTS INTERMITTENT CONSTIPATION. URGENCY IN THE MORNING.: ICD-10-CM

## 2023-02-02 DIAGNOSIS — R10.84 ABDOMINAL CRAMPING, GENERALIZED: ICD-10-CM

## 2023-02-02 PROCEDURE — 99232 SBSQ HOSP IP/OBS MODERATE 35: CPT | Performed by: PHYSICIAN ASSISTANT

## 2023-02-04 ENCOUNTER — TELEPHONE ENCOUNTER (OUTPATIENT)
Dept: URBAN - METROPOLITAN AREA CLINIC 113 | Facility: CLINIC | Age: 68
End: 2023-02-04

## 2023-02-04 RX ORDER — LEVOFLOXACIN 250 MG
1 TABLET TABLET ORAL ONCE A DAY
Qty: 7 TABLET | Refills: 0 | OUTPATIENT
Start: 2023-02-04 | End: 2023-02-11

## 2023-02-07 ENCOUNTER — TELEPHONE ENCOUNTER (OUTPATIENT)
Dept: URBAN - METROPOLITAN AREA CLINIC 113 | Facility: CLINIC | Age: 68
End: 2023-02-07

## 2023-02-07 ENCOUNTER — OFFICE VISIT (OUTPATIENT)
Dept: URBAN - METROPOLITAN AREA CLINIC 107 | Facility: CLINIC | Age: 68
End: 2023-02-07
Payer: MEDICARE

## 2023-02-07 VITALS
SYSTOLIC BLOOD PRESSURE: 136 MMHG | HEART RATE: 67 BPM | TEMPERATURE: 98.2 F | HEIGHT: 66 IN | WEIGHT: 121 LBS | DIASTOLIC BLOOD PRESSURE: 59 MMHG | BODY MASS INDEX: 19.44 KG/M2

## 2023-02-07 DIAGNOSIS — R10.11 RUQ ABDOMINAL PAIN: ICD-10-CM

## 2023-02-07 DIAGNOSIS — K59.09 OTHER CONSTIPATION: ICD-10-CM

## 2023-02-07 DIAGNOSIS — K86.2 PANCREATIC CYST: ICD-10-CM

## 2023-02-07 DIAGNOSIS — K21.9 GASTROESOPHAGEAL REFLUX DISEASE, UNSPECIFIED WHETHER ESOPHAGITIS PRESENT: ICD-10-CM

## 2023-02-07 PROBLEM — 301717006: Status: ACTIVE | Noted: 2022-12-09

## 2023-02-07 PROBLEM — 14760008: Status: ACTIVE | Noted: 2022-12-09

## 2023-02-07 PROBLEM — 235595009: Status: ACTIVE | Noted: 2022-12-09

## 2023-02-07 PROCEDURE — 99213 OFFICE O/P EST LOW 20 MIN: CPT | Performed by: NURSE PRACTITIONER

## 2023-02-07 RX ORDER — ERGOCALCIFEROL 1.25 MG/1
1 CAPSULE CAPSULE ORAL
Status: ACTIVE | COMMUNITY

## 2023-02-07 RX ORDER — PANTOPRAZOLE SODIUM 40 MG/1
1 TABLET TABLET, DELAYED RELEASE ORAL ONCE A DAY
Status: ACTIVE | COMMUNITY

## 2023-02-07 RX ORDER — INSULIN LISPRO 100 [IU]/ML
AS DIRECTED INJECTION, SOLUTION INTRAVENOUS; SUBCUTANEOUS
Status: ACTIVE | COMMUNITY

## 2023-02-07 RX ORDER — BUMETANIDE 2 MG/1
TAKE 1 TABLET TABLET ORAL TWICE A DAY
Status: ACTIVE | COMMUNITY

## 2023-02-07 RX ORDER — ONDANSETRON HYDROCHLORIDE 8 MG/1
1 TABLET AS NEEDED TABLET, FILM COATED ORAL ONCE A DAY
Qty: 30 | Refills: 2 | Status: ACTIVE | COMMUNITY
Start: 2022-09-29

## 2023-02-07 RX ORDER — DILTIAZEM HYDROCHLORIDE 240 MG/1
1 CAPSULE CAPSULE, EXTENDED RELEASE ORAL ONCE A DAY
Status: ACTIVE | COMMUNITY

## 2023-02-07 RX ORDER — SEVELAMER CARBONATE 2400 MG/1
1 PACKET MIXED WITH 60 ML OF WATER WITH MEALS POWDER, FOR SUSPENSION ORAL THREE TIMES A DAY
Status: ACTIVE | COMMUNITY

## 2023-02-07 RX ORDER — FLUTICASONE PROPIONATE 50 UG/1
1 SPRAY IN EACH NOSTRIL SPRAY, METERED NASAL ONCE A DAY
Status: ACTIVE | COMMUNITY

## 2023-02-07 RX ORDER — LOSARTAN POTASSIUM 100 MG/1
1 TABLET TABLET ORAL ONCE A DAY
Status: ACTIVE | COMMUNITY

## 2023-02-07 RX ORDER — LEVOFLOXACIN 250 MG
1 TABLET TABLET ORAL ONCE A DAY
Qty: 7 TABLET | Refills: 0 | Status: ACTIVE | COMMUNITY
Start: 2023-02-04 | End: 2023-02-11

## 2023-02-07 RX ORDER — TACROLIMUS 1 MG/1
AS DIRECTED CAPSULE, GELATIN COATED ORAL TWICE A DAY
Status: ACTIVE | COMMUNITY

## 2023-02-07 RX ORDER — HYDRALAZINE HYDROCHLORIDE 10 MG/1
3 TABLETS TABLET, FILM COATED ORAL ONCE A DAY
Status: ACTIVE | COMMUNITY

## 2023-02-07 RX ORDER — CARVEDILOL 25 MG/1
1 TABLET WITH FOOD TABLET, FILM COATED ORAL TWICE A DAY
Refills: 0 | Status: ACTIVE | COMMUNITY

## 2023-02-07 RX ORDER — CYCLOBENZAPRINE HYDROCHLORIDE 5 MG/1
1 TABLET AT BEDTIME AS NEEDED TABLET, FILM COATED ORAL ONCE A DAY
Status: ACTIVE | COMMUNITY

## 2023-02-07 NOTE — PHYSICAL EXAM CHEST:
breathing is unlabored, diminished breath sounds right base; mild chest wall tenderness right posterior thorax

## 2023-02-07 NOTE — HPI-OTHER HISTORIES
EUS 1/23/2023:Normal esophagus, stomach, and examined duodenum.  20 x 9 mm cystic lesion adjacent to the common hepatic duct in the region of the biliary anastomosis.  Suspect infected postoperative fluid collection.  Diagnostic FNA aspiration performed with removal of 2 cc of purulent fluid sent for cell count and culture.  8 mm cyst in the pancreatic head.  Tissue has not been obtained.  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  Hyperechoic material suggestive of air was visualized in the common bile duct and in the common hepatic duct, normal finding post sphincterotomy.  She was prescribed Cipro 500 mg twice a day for 7 days.  Pathology: Benign histiocytes.  No epithelial cells identified.  Negative for malignancy. No culture results available. Labs 12/5/2022:CBC: WBC 6.2, hemoglobin 14.1, MCV 90, platelets 229.  BMP normal with exception of glucose 258, sodium 132, chloride 91, BUN 50, creatinine 7.12.  LFTs: TB 0.4, , ALT 12, AST 21.  Urinalysis normal with exception of 3+ protein.  PT 10.6, INR 0.94.  Lipase 201.  PTT 32.2.  Troponin  0.020. CT of the abdomen and pelvis with contrast 12/5/2022 (compared with CT abdomen and pelvis without contrast 9/4/2022 and MRI abdomen 9/5/2022): Hypoattenuating focus within the inferior right hepatic lobe measuring up to 1 cm which may be artifactual or could reflect a small liver lesion which is indeterminate and follow-up MRI of the liver is recommended.  Multiple ventral hernias which contain fat, transverse colon, and loops of small bowel without evidence of obstruction which appears similar to prior. Colonoscopy 9/29/2022:Sigmoid and descending diverticulosis, removal of a 3 mm sigmoid polyp,  benign-appearing stricture in the sigmoid colon that was traversed, nonbleeding internal hemorrhoids, the entire colon was normal and biopsied.  Pathology: Sigmoid polyp was a tubular adenoma.  Random biopsies demonstrated colonic mucosa negative for features of microscopic colitis.

## 2023-02-07 NOTE — HPI-TODAY'S VISIT:
This is a 67-year-old female with a history of cirrhosis/end-stage liver disease related to HCV previous nonresponder to interferon, status post orthotopic liver transplant (2014) at [place] with detectable HCV post transplant status post treatment with Harvoni achieving SVR (2016), end-stage renal disease on hemodialysis, GERD, colon diverticulosis, diarrhea, abdominal pain, and constipation presenting for follow-up. She was last seen 1/5/2023 reporting persistent abdominal pain.  Dr. Armstrong was of the opinion it was likely related to constipation.  Recent colonoscopy was nondiagnostic.  Upper endoscopy was a consideration but, due to cystic abnormality in the area of the duodenum and pancreas on CT and MRI, EUS was scheduled.  Labs were obtained.  She was also scheduled for a CT angiography to rule out mesenteric ischemia.  She was instructed in a bowel purge and was to continue Metamucil 2 tablespoons daily, milk of magnesia daily, and MiraLAX daily. She was seen in consultation 2/1/2023 when she presented with ongoing abdominal pain, intermittent fevers, nausea, and vomiting.  A CT scan described a circumcised fluid collection measuring 2.0 x 0.8 cm at the calin pedis adjacent to the common bile duct which was unchanged from prior study.  There was a new bibasilar pulmonary edema, multiple ventral abdominal wall hernias, and mixed plaque in the origin of celiac artery and SMA (determined as insignificant).  Liver tests were normal.  Hemoglobin was baseline at 7.7 and WBCs were normal.  It was discussed that her abdominal pain was likely associated with chronic constipation.  She was to continue MiraLAX twice a day with milk of magnesia every other night as needed. EUS 1/23/2023:Normal esophagus, stomach, and examined duodenum.  20 x 9 mm cystic lesion adjacent to the common hepatic duct in the region of the biliary anastomosis.  Suspect infected postoperative fluid collection.  Diagnostic FNA aspiration performed with removal of 2 cc of purulent fluid sent for cell count and culture.  8 mm cyst in the pancreatic head.  Tissue has not been obtained.  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  Hyperechoic material suggestive of air was visualized in the common bile duct and in the common hepatic duct, normal finding post sphincterotomy.  She was prescribed Cipro 500 mg twice a day for 7 days.  Pathology: Benign histiocytes.  No epithelial cells identified.  Negative for malignancy. No culture results available. She reports resolution of abdominal pain.  However, when she is lying in bed, she feels a "catch" in the right mid abdomen, right side of her chest toward the posterior area, and pain in the posterior lower thoracic area.  She feels as though this prevents her from taking a deep breath.  She feels better when she is upright.  She is taking pantoprazole 40 mg twice a day and denies acid reflux symptoms.  She is having a daily bowel movement taking MiraLAX 1 capful daily.  She was discharged from the hospital and was not prescribed antibiotics.  She called our office and was prescribed Levaquin 250 mg daily which she started taking 12/5/2023.  She will follow-up with her primary care physician on 2/15. When she was in the hospital, she had a frequent, harsh cough.  This has improved.  She reports Dr. Brady was to speak with Dr. Armstrong about discussing the abscess with her hepatologist at [place].  She is currently taking oxycodone left from a previous prescription as needed for pain.  It causes nausea so she is using it infrequently.  She will discuss a prescription for tramadol with Dr. Junior Armstrong at follow-up.

## 2023-02-08 PROBLEM — 161671001 HISTORY OF LIVER RECIPIENT: Status: ACTIVE | Noted: 2022-09-20

## 2023-03-10 ENCOUNTER — OFFICE VISIT (OUTPATIENT)
Dept: URBAN - METROPOLITAN AREA SURGERY CENTER 25 | Facility: SURGERY CENTER | Age: 68
End: 2023-03-10

## 2023-05-16 ENCOUNTER — OFFICE VISIT (OUTPATIENT)
Dept: URBAN - METROPOLITAN AREA CLINIC 107 | Facility: CLINIC | Age: 68
End: 2023-05-16

## 2023-08-15 ENCOUNTER — DASHBOARD ENCOUNTERS (OUTPATIENT)
Age: 68
End: 2023-08-15

## 2023-08-15 ENCOUNTER — LAB OUTSIDE AN ENCOUNTER (OUTPATIENT)
Dept: URBAN - METROPOLITAN AREA CLINIC 107 | Facility: CLINIC | Age: 68
End: 2023-08-15

## 2023-08-15 ENCOUNTER — OFFICE VISIT (OUTPATIENT)
Dept: URBAN - METROPOLITAN AREA CLINIC 107 | Facility: CLINIC | Age: 68
End: 2023-08-15
Payer: MEDICARE

## 2023-08-15 VITALS
DIASTOLIC BLOOD PRESSURE: 56 MMHG | HEIGHT: 66 IN | SYSTOLIC BLOOD PRESSURE: 142 MMHG | TEMPERATURE: 96.9 F | WEIGHT: 126 LBS | BODY MASS INDEX: 20.25 KG/M2 | RESPIRATION RATE: 18 BRPM | HEART RATE: 54 BPM

## 2023-08-15 DIAGNOSIS — R07.89 RIGHT-SIDED CHEST WALL PAIN: ICD-10-CM

## 2023-08-15 DIAGNOSIS — K21.9 GASTROESOPHAGEAL REFLUX DISEASE, UNSPECIFIED WHETHER ESOPHAGITIS PRESENT: ICD-10-CM

## 2023-08-15 DIAGNOSIS — K59.09 OTHER CONSTIPATION: ICD-10-CM

## 2023-08-15 DIAGNOSIS — K86.2 PANCREATIC CYST: ICD-10-CM

## 2023-08-15 DIAGNOSIS — R10.11 RUQ ABDOMINAL PAIN: ICD-10-CM

## 2023-08-15 DIAGNOSIS — K75.0 HEPATIC ABSCESS: ICD-10-CM

## 2023-08-15 PROCEDURE — 99214 OFFICE O/P EST MOD 30 MIN: CPT | Performed by: INTERNAL MEDICINE

## 2023-08-15 RX ORDER — SEVELAMER CARBONATE 2400 MG/1
1 PACKET MIXED WITH 60 ML OF WATER WITH MEALS POWDER, FOR SUSPENSION ORAL THREE TIMES A DAY
Status: ACTIVE | COMMUNITY

## 2023-08-15 RX ORDER — PANTOPRAZOLE SODIUM 40 MG/1
1 TABLET TABLET, DELAYED RELEASE ORAL ONCE A DAY
Status: ACTIVE | COMMUNITY

## 2023-08-15 RX ORDER — ONDANSETRON HYDROCHLORIDE 8 MG/1
1 TABLET AS NEEDED TABLET, FILM COATED ORAL ONCE A DAY
Qty: 30 | Refills: 2 | Status: ACTIVE | COMMUNITY
Start: 2022-09-29

## 2023-08-15 RX ORDER — INSULIN LISPRO 100 [IU]/ML
AS DIRECTED INJECTION, SOLUTION INTRAVENOUS; SUBCUTANEOUS
Status: ACTIVE | COMMUNITY

## 2023-08-15 RX ORDER — FLUTICASONE PROPIONATE 50 UG/1
1 SPRAY IN EACH NOSTRIL SPRAY, METERED NASAL ONCE A DAY
Status: ACTIVE | COMMUNITY

## 2023-08-15 RX ORDER — BUMETANIDE 2 MG/1
TAKE 1 TABLET TABLET ORAL TWICE A DAY
Status: ACTIVE | COMMUNITY

## 2023-08-15 RX ORDER — ERGOCALCIFEROL 1.25 MG/1
1 CAPSULE CAPSULE ORAL
Status: ACTIVE | COMMUNITY

## 2023-08-15 RX ORDER — CYCLOBENZAPRINE HYDROCHLORIDE 5 MG/1
1 TABLET AT BEDTIME AS NEEDED TABLET, FILM COATED ORAL ONCE A DAY
Status: ACTIVE | COMMUNITY

## 2023-08-15 RX ORDER — CARVEDILOL 25 MG/1
1 TABLET WITH FOOD TABLET, FILM COATED ORAL TWICE A DAY
Refills: 0 | Status: ACTIVE | COMMUNITY

## 2023-08-15 RX ORDER — TACROLIMUS 1 MG/1
AS DIRECTED CAPSULE, GELATIN COATED ORAL TWICE A DAY
Status: ACTIVE | COMMUNITY

## 2023-08-15 RX ORDER — LOSARTAN POTASSIUM 100 MG/1
1 TABLET TABLET ORAL ONCE A DAY
Status: ACTIVE | COMMUNITY

## 2023-08-15 RX ORDER — HYDRALAZINE HYDROCHLORIDE 10 MG/1
3 TABLETS TABLET, FILM COATED ORAL ONCE A DAY
Status: ACTIVE | COMMUNITY

## 2023-08-15 RX ORDER — DILTIAZEM HYDROCHLORIDE 240 MG/1
1 CAPSULE CAPSULE, EXTENDED RELEASE ORAL ONCE A DAY
Status: ACTIVE | COMMUNITY

## 2023-08-15 NOTE — HPI-TODAY'S VISIT:
This is a 68-year-old female with a history of cirrhosis/end-stage liver disease related to HCV previous nonresponder to interferon, status post orthotopic liver transplant (2014) at [facility] with detectable HCV post transplant status post treatment with Harvoni achieving SVR (2016), end-stage renal disease on hemodialysis, GERD, colon diverticulosis, diarrhea, abdominal pain, and constipation presenting for follow-up. She was last seen on 2/7/23.  She was seen 1/5/2023 reporting persistent abdominal pain.  Dr. Armstrong was of the opinion it was likely related to constipation.  Recent colonoscopy was nondiagnostic.  Upper endoscopy was a consideration but, due to cystic abnormality in the area of the duodenum and pancreas on CT and MRI, EUS was scheduled.  Labs were obtained.  She was also scheduled for a CT angiography to rule out mesenteric ischemia.  She was instructed in a bowel purge and was to continue Metamucil 2 tablespoons daily, milk of magnesia daily, and MiraLAX daily.  She was seen in consultation 2/1/2023 when she presented with ongoing abdominal pain, intermittent fevers, nausea, and vomiting.  A CT scan described a well-circumcised fluid collection measuring 2.0 x 0.8 cm at the calin pedis adjacent to the common bile duct which was unchanged from prior study.  There was a new bibasilar pulmonary edema, multiple ventral abdominal wall hernias, and mixed plaque in the origin of celiac artery and SMA (determined as insignificant).  Liver tests were normal.  Hemoglobin was baseline at 7.7 and WBCs were normal.  It was discussed that her abdominal pain was likely associated with chronic constipation.  She was to continue MiraLAX twice a day with milk of magnesia every other night as needed.  EUS 1/23/2023:Normal esophagus, stomach, and examined duodenum.  20 x 9 mm cystic lesion adjacent to the common hepatic duct in the region of the biliary anastomosis.  Suspect infected postoperative fluid collection.  Diagnostic FNA aspiration performed with removal of 2 cc of purulent fluid sent for cell count and culture.  8 mm cyst in the pancreatic head.  Tissue has not been obtained.  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  Hyperechoic material suggestive of air was visualized in the common bile duct and in the common hepatic duct, normal finding post sphincterotomy.  She was prescribed Cipro 500 mg twice a day for 7 days.  Pathology: Benign histiocytes.  No epithelial cells identified.  Negative for malignancy.  No culture results available.  At the time of her 2/7/23 office visit, she reported resolution of abdominal pain.  However, when she is lying in bed, she feels a "catch" in the right mid abdomen, right side of her chest toward the posterior area, and pain in the posterior lower thoracic area.  She feels as though this prevents her from taking a deep breath.  She feels better when she is upright.  She is taking pantoprazole 40 mg twice a day and denies acid reflux symptoms.  She is having a daily bowel movement taking MiraLAX 1 capful daily.  She was discharged from the hospital and was not prescribed antibiotics.  She called our office and was prescribed Levaquin 250 mg daily which she started taking 12/5/2023.   When she was in the hospital, she had a frequent, harsh cough.  This has resolved.  The patient is doing relatively well currently.  She she has had no fever, chills, sweats, etc.  She does complain of intermittent constipation, alternating with diarrhea.  She feels that she gets "backed up" and then will have to use MiraLAX, which can cause diarrhea.  She can sometimes go for several weeks without any alteration in bowel habits, however, and denies any rectal bleeding, etc.  Her reflux symptoms are controlled on omeprazole daily.  Interestingly, she went to [facility] 3 weeks ago, and they requested an MRI of the abdomen and pelvis, looking specifically at the vascular supply.  Reflux symptoms are controlled on pantoprazole 40 mg p.o. twice daily.
Stable

## 2024-12-06 ENCOUNTER — OFFICE VISIT (OUTPATIENT)
Dept: URBAN - METROPOLITAN AREA CLINIC 107 | Facility: CLINIC | Age: 69
End: 2024-12-06

## 2024-12-13 ENCOUNTER — OFFICE VISIT (OUTPATIENT)
Dept: URBAN - METROPOLITAN AREA CLINIC 107 | Facility: CLINIC | Age: 69
End: 2024-12-13

## 2025-01-02 ENCOUNTER — OFFICE VISIT (OUTPATIENT)
Dept: URBAN - METROPOLITAN AREA CLINIC 107 | Facility: CLINIC | Age: 70
End: 2025-01-02
Payer: MEDICARE

## 2025-01-02 VITALS
HEART RATE: 77 BPM | HEIGHT: 66 IN | TEMPERATURE: 97.7 F | WEIGHT: 121 LBS | SYSTOLIC BLOOD PRESSURE: 168 MMHG | DIASTOLIC BLOOD PRESSURE: 70 MMHG | BODY MASS INDEX: 19.44 KG/M2

## 2025-01-02 DIAGNOSIS — R19.5 LOOSE STOOLS: ICD-10-CM

## 2025-01-02 DIAGNOSIS — R68.81 EARLY SATIETY: ICD-10-CM

## 2025-01-02 DIAGNOSIS — K86.2 PANCREATIC CYST: ICD-10-CM

## 2025-01-02 DIAGNOSIS — K21.9 GASTROESOPHAGEAL REFLUX DISEASE, UNSPECIFIED WHETHER ESOPHAGITIS PRESENT: ICD-10-CM

## 2025-01-02 PROCEDURE — 99214 OFFICE O/P EST MOD 30 MIN: CPT | Performed by: NURSE PRACTITIONER

## 2025-01-02 RX ORDER — ONDANSETRON HYDROCHLORIDE 8 MG/1
1 TABLET AS NEEDED TABLET, FILM COATED ORAL ONCE A DAY
Qty: 30 | Refills: 2 | Status: ACTIVE | COMMUNITY
Start: 2022-09-29

## 2025-01-02 RX ORDER — BUMETANIDE 2 MG/1
TAKE 1 TABLET TABLET ORAL TWICE A DAY
Status: ACTIVE | COMMUNITY

## 2025-01-02 RX ORDER — CARVEDILOL 25 MG/1
1 TABLET WITH FOOD TABLET, FILM COATED ORAL TWICE A DAY
Refills: 0 | Status: ACTIVE | COMMUNITY

## 2025-01-02 RX ORDER — PANTOPRAZOLE SODIUM 40 MG/1
1 TABLET TABLET, DELAYED RELEASE ORAL ONCE A DAY
Status: ACTIVE | COMMUNITY

## 2025-01-02 RX ORDER — TACROLIMUS 1 MG/1
AS DIRECTED CAPSULE, GELATIN COATED ORAL TWICE A DAY
Status: ACTIVE | COMMUNITY

## 2025-01-02 RX ORDER — SEVELAMER CARBONATE 2400 MG/1
1 PACKET MIXED WITH 60 ML OF WATER WITH MEALS POWDER, FOR SUSPENSION ORAL THREE TIMES A DAY
Status: ACTIVE | COMMUNITY

## 2025-01-02 RX ORDER — CYCLOBENZAPRINE HYDROCHLORIDE 5 MG/1
1 TABLET AT BEDTIME AS NEEDED TABLET, FILM COATED ORAL ONCE A DAY
Status: ACTIVE | COMMUNITY

## 2025-01-02 RX ORDER — HYDRALAZINE HYDROCHLORIDE 10 MG/1
3 TABLETS TABLET, FILM COATED ORAL ONCE A DAY
Status: ACTIVE | COMMUNITY

## 2025-01-02 RX ORDER — ERGOCALCIFEROL 1.25 MG/1
1 CAPSULE CAPSULE ORAL
Status: ACTIVE | COMMUNITY

## 2025-01-02 RX ORDER — FLUTICASONE PROPIONATE 50 UG/1
1 SPRAY IN EACH NOSTRIL SPRAY, METERED NASAL ONCE A DAY
Status: ACTIVE | COMMUNITY

## 2025-01-02 RX ORDER — INSULIN LISPRO 100 [IU]/ML
AS DIRECTED INJECTION, SOLUTION INTRAVENOUS; SUBCUTANEOUS
Status: ACTIVE | COMMUNITY

## 2025-01-02 RX ORDER — DILTIAZEM HYDROCHLORIDE 240 MG/1
1 CAPSULE CAPSULE, EXTENDED RELEASE ORAL ONCE A DAY
Status: ACTIVE | COMMUNITY

## 2025-01-02 RX ORDER — LOSARTAN POTASSIUM 100 MG/1
1 TABLET TABLET ORAL ONCE A DAY
Status: ACTIVE | COMMUNITY

## 2025-01-02 RX ORDER — PANTOPRAZOLE SODIUM 40 MG/1
1 TABLET 30 MINUTES BEFORE BREAKFAST AND DINNER ON AN EMPTY STOMACH TABLET, DELAYED RELEASE ORAL TWICE A DAY
Qty: 180 | Refills: 1

## 2025-01-02 NOTE — HPI-TODAY'S VISIT:
This is a 68-year-old female with a history of cirrhosis/end-stage liver disease related to HCV previous nonresponder to interferon, status post orthotopic liver transplant (2014) at [place] with detectable HCV post transplant status post treatment with Harvoni achieving SVR (2016), end-stage renal disease on hemodialysis, GERD, colon diverticulosis, diarrhea, abdominal pain, and constipation presenting for follow-up. She was last seen on 2/7/23.  Last seen 8/15/2023 for hepatic abscess follow-up. Repeat MRI imaging ordered. No right upper quadrant abdominal pain. For constipation continue MiraLAX and fiber. For GERD continue pantoprazole 40 mg twice a day. End-stage renal disease on home dialysis following Dr. Sheriff.  Previously seen 1/5/2023 reporting persistent abdominal pain.  Dr. Armstrong was of the opinion it was likely related to constipation.  Recent colonoscopy was nondiagnostic.  Upper endoscopy was a consideration but, due to cystic abnormality in the area of the duodenum and pancreas on CT and MRI, EUS was scheduled.  Labs were obtained.  She was also scheduled for a CT angiography to rule out mesenteric ischemia.  She was instructed in a bowel purge and was to continue Metamucil 2 tablespoons daily, milk of magnesia daily, and MiraLAX daily.  She was seen in consultation 2/1/2023 when she presented with ongoing abdominal pain, intermittent fevers, nausea, and vomiting.  A CT scan described a well-circumcised fluid collection measuring 2.0 x 0.8 cm at the calin pedis adjacent to the common bile duct which was unchanged from prior study.  There was a new bibasilar pulmonary edema, multiple ventral abdominal wall hernias, and mixed plaque in the origin of celiac artery and SMA (determined as insignificant).  Liver tests were normal.  Hemoglobin was baseline at 7.7 and WBCs were normal.  It was discussed that her abdominal pain was likely associated with chronic constipation.  She was to continue MiraLAX twice a day with milk of magnesia every other night as needed.  At the time of her 2/7/23 office visit, she reported resolution of abdominal pain.  However, when she is lying in bed, she feels a "catch" in the right mid-abdomen, right side of her chest toward the posterior area, and pain in the posterior lower thoracic area.  She feels as though this prevents her from taking a deep breath.  She feels better when she is upright.  She is taking pantoprazole 40 mg twice a day and denies acid reflux symptoms.  She is having a daily bowel movement taking MiraLAX 1 capful daily.  She was discharged from the hospital and was not prescribed antibiotics.  She called our office and was prescribed Levaquin 250 mg daily which she started taking 12/5/2023.   When she was in the hospital, she had a frequent, harsh cough.  This has resolved.  The patient is doing relatively well currently.  She has had no fever, chills, sweats, etc.  She does complain of intermittent constipation, alternating with diarrhea.  She feels that she gets "backed up" and then will have to use MiraLAX, which can cause diarrhea.  She can sometimes go for several weeks without any alteration in bowel habits, however, and denies any rectal bleeding, etc.  Her reflux symptoms are controlled on omeprazole daily.  Interestingly, she went to [facility] 3 weeks ago, and they requested an MRI of the abdomen and pelvis, looking specifically at the vascular supply.  Reflux symptoms are controlled on pantoprazole 40 mg p.o. twice daily. Has history of end-stage liver disease related to HCV status post liver transplant 2014 at [facility] and status posttreatment with Harvoni achieving SVR 2016.  Interval history, 1/2/2025 MRI abdomen without contrast 9/6/2023.  Stable 9 mm simple cyst within head of pancreas.  Pancreas divisum.  Previously mention 2 cm cystic structure within region of gallbladder fossa is the residual cystic duct.  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  Large right paramedian ventral wall hernia which contains large bowel no obstruction or strangulation.  No ascites appreciated. Recommend MRI abdomen with and without IV contrast in 12 months to confirm stability.  Diarrhea started when she was put on something to decrease her phosphorus level by her nephrologist. Was having incontinence. Tums have helped. She started the fiber bars, she does not like the powder. She had 2 rounds of antibiotics for a bad cold recently. Usually has a BM after she eats breakfast. She is awaiting renal transplant at [facility] in [place] and [facility]. She does not want to go back to [place]. Was asked to get a colonoscopy updated. She reports early satiety, she doesn't eat much.  No vomiting.  No melena or hematochezia.   She is still seeing [facility] via telehealth with Dr. Mcmanus annually, has upcoming appointment.   Weight is down 5 pounds from her last appointment

## 2025-01-03 ENCOUNTER — LAB OUTSIDE AN ENCOUNTER (OUTPATIENT)
Dept: URBAN - METROPOLITAN AREA CLINIC 107 | Facility: CLINIC | Age: 70
End: 2025-01-03

## 2025-01-03 ENCOUNTER — TELEPHONE ENCOUNTER (OUTPATIENT)
Dept: URBAN - METROPOLITAN AREA CLINIC 107 | Facility: CLINIC | Age: 70
End: 2025-01-03

## 2025-01-03 RX ORDER — MEGESTROL ACETATE 20 MG/1
1 TABLET TABLET ORAL TWICE A DAY
Qty: 60 | Refills: 3 | OUTPATIENT
Start: 2025-01-06 | End: 2025-05-06

## 2025-01-04 PROBLEM — 64379006: Status: ACTIVE | Noted: 2025-01-04

## 2025-01-21 ENCOUNTER — TELEPHONE ENCOUNTER (OUTPATIENT)
Dept: URBAN - METROPOLITAN AREA CLINIC 107 | Facility: CLINIC | Age: 70
End: 2025-01-21

## 2025-01-21 PROBLEM — 399187006: Status: ACTIVE | Noted: 2025-01-21

## 2025-02-03 ENCOUNTER — TELEPHONE ENCOUNTER (OUTPATIENT)
Dept: URBAN - METROPOLITAN AREA CLINIC 107 | Facility: CLINIC | Age: 70
End: 2025-02-03

## 2025-02-19 ENCOUNTER — TELEPHONE ENCOUNTER (OUTPATIENT)
Dept: URBAN - METROPOLITAN AREA CLINIC 107 | Facility: CLINIC | Age: 70
End: 2025-02-19

## 2025-04-10 ENCOUNTER — ERX REFILL RESPONSE (OUTPATIENT)
Dept: URBAN - METROPOLITAN AREA CLINIC 72 | Facility: CLINIC | Age: 70
End: 2025-04-10

## 2025-04-10 RX ORDER — PANTOPRAZOLE SODIUM 40 MG/1
TAKE 1 TABLET BY MOUTH TWICE DAILY 30 MINUTES BEFORE BREAKFAST AND DINNER ON AN EMPTY STOMACH TABLET, DELAYED RELEASE ORAL
Qty: 180 TABLET | Refills: 0 | OUTPATIENT

## 2025-04-10 RX ORDER — PANTOPRAZOLE SODIUM 40 MG/1
1 TABLET 30 MINUTES BEFORE BREAKFAST AND DINNER ON AN EMPTY STOMACH TABLET, DELAYED RELEASE ORAL TWICE A DAY
Qty: 180 | Refills: 1 | OUTPATIENT

## 2025-07-02 ENCOUNTER — OFFICE VISIT (OUTPATIENT)
Dept: URBAN - METROPOLITAN AREA CLINIC 107 | Facility: CLINIC | Age: 70
End: 2025-07-02

## 2025-07-14 ENCOUNTER — OFFICE VISIT (OUTPATIENT)
Dept: URBAN - METROPOLITAN AREA CLINIC 107 | Facility: CLINIC | Age: 70
End: 2025-07-14
Payer: MEDICARE

## 2025-07-14 ENCOUNTER — TELEPHONE ENCOUNTER (OUTPATIENT)
Dept: URBAN - METROPOLITAN AREA CLINIC 107 | Facility: CLINIC | Age: 70
End: 2025-07-14

## 2025-07-14 DIAGNOSIS — K21.9 GASTROESOPHAGEAL REFLUX DISEASE, UNSPECIFIED WHETHER ESOPHAGITIS PRESENT: ICD-10-CM

## 2025-07-14 DIAGNOSIS — K43.9 VENTRAL HERNIA WITHOUT OBSTRUCTION OR GANGRENE: ICD-10-CM

## 2025-07-14 DIAGNOSIS — R19.5 LOOSE STOOLS: ICD-10-CM

## 2025-07-14 DIAGNOSIS — K86.2 PANCREATIC CYST: ICD-10-CM

## 2025-07-14 DIAGNOSIS — E83.118 SECONDARY HEMOCHROMATOSIS: ICD-10-CM

## 2025-07-14 DIAGNOSIS — R63.4 WEIGHT LOSS, UNINTENTIONAL: ICD-10-CM

## 2025-07-14 DIAGNOSIS — Z94.4 HISTORY OF LIVER TRANSPLANT: ICD-10-CM

## 2025-07-14 DIAGNOSIS — Z86.0101 HISTORY OF ADENOMATOUS POLYP OF COLON: ICD-10-CM

## 2025-07-14 PROBLEM — 53836006: Status: ACTIVE | Noted: 2025-07-14

## 2025-07-14 PROCEDURE — 99214 OFFICE O/P EST MOD 30 MIN: CPT | Performed by: NURSE PRACTITIONER

## 2025-07-14 RX ORDER — BUMETANIDE 2 MG/1
TAKE 1 TABLET TABLET ORAL TWICE A DAY
Status: ACTIVE | COMMUNITY

## 2025-07-14 RX ORDER — CARVEDILOL 25 MG/1
1 TABLET WITH FOOD TABLET, FILM COATED ORAL TWICE A DAY
Refills: 0 | Status: ACTIVE | COMMUNITY

## 2025-07-14 RX ORDER — COLESTIPOL HYDROCHLORIDE 1 G/1
2 TABLETS TABLET, FILM COATED ORAL ONCE A DAY
Qty: 60 TABLET | Refills: 5 | OUTPATIENT
Start: 2025-07-14

## 2025-07-14 RX ORDER — HYDRALAZINE HYDROCHLORIDE 100 MG/1
3 TABLETS TABLET ORAL ONCE A DAY
Status: ACTIVE | COMMUNITY

## 2025-07-14 RX ORDER — SEVELAMER CARBONATE 2400 MG/1
1 PACKET MIXED WITH 60 ML OF WATER WITH MEALS POWDER, FOR SUSPENSION ORAL THREE TIMES A DAY
Status: DISCONTINUED | COMMUNITY

## 2025-07-14 RX ORDER — INSULIN LISPRO 100 [IU]/ML
AS DIRECTED INJECTION, SOLUTION INTRAVENOUS; SUBCUTANEOUS
Status: ACTIVE | COMMUNITY

## 2025-07-14 RX ORDER — CYCLOBENZAPRINE HYDROCHLORIDE 5 MG/1
1 TABLET AT BEDTIME AS NEEDED TABLET, FILM COATED ORAL ONCE A DAY
Status: ACTIVE | COMMUNITY

## 2025-07-14 RX ORDER — ERGOCALCIFEROL 1.25 MG/1
1 CAPSULE CAPSULE ORAL
Status: ACTIVE | COMMUNITY

## 2025-07-14 RX ORDER — DILTIAZEM HYDROCHLORIDE 240 MG/1
1 CAPSULE CAPSULE, EXTENDED RELEASE ORAL ONCE A DAY
Status: ACTIVE | COMMUNITY

## 2025-07-14 RX ORDER — LOSARTAN POTASSIUM 100 MG/1
1 TABLET TABLET ORAL ONCE A DAY
Status: ACTIVE | COMMUNITY

## 2025-07-14 RX ORDER — MIRTAZAPINE 15 MG/1
1 TABLET AT BEDTIME TABLET, FILM COATED ORAL ONCE A DAY
Status: ACTIVE | COMMUNITY

## 2025-07-14 RX ORDER — FLUTICASONE PROPIONATE 50 UG/1
1 SPRAY IN EACH NOSTRIL SPRAY, METERED NASAL ONCE A DAY
Status: ACTIVE | COMMUNITY

## 2025-07-14 RX ORDER — ONDANSETRON HYDROCHLORIDE 8 MG/1
1 TABLET AS NEEDED TABLET, FILM COATED ORAL ONCE A DAY
Qty: 30 | Refills: 2 | Status: ACTIVE | COMMUNITY
Start: 2022-09-29

## 2025-07-14 RX ORDER — PANTOPRAZOLE SODIUM 40 MG/1
TAKE 1 TABLET BY MOUTH TWICE DAILY 30 MINUTES BEFORE BREAKFAST AND DINNER ON AN EMPTY STOMACH TABLET, DELAYED RELEASE ORAL
Qty: 180 TABLET | Refills: 0 | Status: ACTIVE | COMMUNITY

## 2025-07-14 RX ORDER — TACROLIMUS 1 MG/1
AS DIRECTED CAPSULE, GELATIN COATED ORAL TWICE A DAY
Status: ACTIVE | COMMUNITY

## 2025-07-14 NOTE — HPI-TODAY'S VISIT:
This is a 68-year-old female with a history of cirrhosis/end-stage liver disease related to HCV previous nonresponder to interferon, status post orthotopic liver transplant (2014) at [place] with detectable HCV post transplant status post treatment with Harvoni achieving SVR (2016), end-stage renal disease on hemodialysis, GERD, colon diverticulosis, diarrhea, abdominal pain, and constipation presenting for follow-up.  Last seen 8/15/2023 for hepatic abscess follow-up. Repeat MRI imaging ordered. No right upper quadrant abdominal pain. For constipation continue MiraLAX and fiber. For GERD continue pantoprazole 40 mg twice a day. End-stage renal disease on home dialysis following Dr. Sheriff.  Previously seen 1/5/2023 reporting persistent abdominal pain.  Dr. Armstrong was of the opinion it was likely related to constipation.  Recent colonoscopy was nondiagnostic.  Upper endoscopy was a consideration but, due to cystic abnormality in the area of the duodenum and pancreas on CT and MRI, EUS was scheduled.  Labs were obtained.  She was also scheduled for a CT angiography to rule out mesenteric ischemia.  She was instructed in a bowel purge and was to continue Metamucil 2 tablespoons daily, milk of magnesia daily, and MiraLAX daily.  She was seen in consultation 2/1/2023 when she presented with ongoing abdominal pain, intermittent fevers, nausea, and vomiting.  A CT scan described a well-circumcised fluid collection measuring 2.0 x 0.8 cm at the calin pedis adjacent to the common bile duct which was unchanged from prior study.  There was a new bibasilar pulmonary edema, multiple ventral abdominal wall hernias, and mixed plaque in the origin of celiac artery and SMA (determined as insignificant).  Liver tests were normal.  Hemoglobin was baseline at 7.7 and WBCs were normal.  It was discussed that her abdominal pain was likely associated with chronic constipation.  She was to continue MiraLAX twice a day with milk of magnesia every other night as needed.  At the time of her 2/7/23 office visit, she reported resolution of abdominal pain.  However, when she is lying in bed, she feels a "catch" in the right mid-abdomen, right side of her chest toward the posterior area, and pain in the posterior lower thoracic area.  She feels as though this prevents her from taking a deep breath.  She feels better when she is upright.  She is taking pantoprazole 40 mg twice a day and denies acid reflux symptoms.  She is having a daily bowel movement taking MiraLAX 1 capful daily.  She was discharged from the hospital and was not prescribed antibiotics.  She called our office and was prescribed Levaquin 250 mg daily which she started taking 12/5/2023.   When she was in the hospital, she had a frequent, harsh cough.  This has resolved.  The patient is doing relatively well currently.  She has had no fever, chills, sweats, etc.  She does complain of intermittent constipation, alternating with diarrhea.  She feels that she gets "backed up" and then will have to use MiraLAX, which can cause diarrhea.  She can sometimes go for several weeks without any alteration in bowel habits, however, and denies any rectal bleeding, etc.  Her reflux symptoms are controlled on omeprazole daily.  Interestingly, she went to [facility] 3 weeks ago, and they requested an MRI of the abdomen and pelvis, looking specifically at the vascular supply.  Reflux symptoms are controlled on pantoprazole 40 mg p.o. twice daily. Has history of end-stage liver disease related to HCV status post liver transplant 2014 at [place] and status posttreatment with Harvoni achieving SVR 2016.  Interval history, 1/2/2025 MRI abdomen without contrast 9/6/2023.  Stable 9 mm simple cyst within head of pancreas.  Pancreas divisum.  Previously mention 2 cm cystic structure within region of gallbladder fossa is the residual cystic duct.  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  Large right paramedian ventral wall hernia which contains large bowel no obstruction or strangulation.  No ascites appreciated. Recommend MRI abdomen with and without IV contrast in 12 months to confirm stability.  Diarrhea started when she was put on something to decrease her phosphorus level by her nephrologist. Was having incontinence. Tums have helped. She started the fiber bars, she does not like the powder. She had 2 rounds of antibiotics for a bad cold recently. Usually has a BM after she eats breakfast. She is awaiting renal transplant at [place] [facility] in [place] and [facility]. She does not want to go back to [place]. Was asked to get a colonoscopy updated. She reports early satiety, she doesn't eat much.  No vomiting.  No melena or hematochezia.   She is still seeing [facility] via telehealth with Dr. Mcmanus annually, has upcoming appointment.   Weight is down 5 pounds from her last appointment Last seen 1-25 for loose stools was advised to increase Metamucil.  Has history of pancreatic cyst due for MRI.  Continue pantoprazole 40 mg twice daily for GERD.  She has seen [facility] in [facility] for consideration of renal transplant.  She requested appetite stimulant.  Discussed with Dr. Armstrong Megace is okay.  20 mg twice daily prescribed if okay with her nephrologist.  Interval history, 7/14/2025 MRI abdomen without contrast 1/14/2025 stable 8 mm simple cyst within head of pancreas may represent IPMN.  Findings stable compared to prior exam recommend follow-up CT or MRI abdomen with and without contrast in 24 months to  confirm stability.  Findings suggest secondary hemochromatosis.Bosniak 1 bilateral renal cysts.  Large right paramedian ventral wall hernia contains large bowel no obstruction or strangulation. CT chest abdomen and pelvis 2/27/2025 2 anterior abdominal wall hernias 8.1 x 6.6 x 4.6 cm sac Containing nonobstructed colon and nonobstructive small bowel measuring 5.4 x 4.5 x 1.4 cm, status post liver transplant.  Moderate atrophy of kidneys.  Small pericardial effusion, minimal cardiomegaly and small pleural effusions bilaterally. Labs 2/28/2025.  LFTs normal.  Lipase normal. Labs 3/1/2025.  CBC:RBC 3.3, Hgb 9.8, HCT 30.5, platelet 142.  BMP: Sodium 135, chloride 96, creatinine 3.2 with normal BUN 19.  Glucose 215. Labs 3/17/2025.  Iron studies normal with iron of 72 and iron saturation 28%.  Ferritin elevated 671.  Hemochromatosis genotype not detected.  She is here with her  today.  Chronic diarrhea. 1st BM is formed, 2 that follow are watery. Has incontinence. Fiber helped but loose stools still present.   No abdominal pain, no melena or hematochezia or oily stools. Imodium caused constipation. Weight is down 10 pounds from her last appointment but she reports she is eating more and feeling better with the megace.   She is going to [place] next week for cardiac workup  She reports she had a MRI of her brain in March and had small bleeds.  Since then her balance is off, states for a while she wasn't able to answer questions quickly but that has resolved. She follows neurology.    On iron infusion once a month, her [facility] nurse for dialysis M,T, Th, F at home dialysis. She reports she usually gets 1 unit of blood a month as well.